# Patient Record
Sex: FEMALE | Race: WHITE | Employment: FULL TIME | URBAN - METROPOLITAN AREA
[De-identification: names, ages, dates, MRNs, and addresses within clinical notes are randomized per-mention and may not be internally consistent; named-entity substitution may affect disease eponyms.]

---

## 2017-02-01 RX ORDER — OMEPRAZOLE 20 MG/1
20 CAPSULE, DELAYED RELEASE ORAL 2 TIMES DAILY
Qty: 60 CAPSULE | Refills: 5 | Status: SHIPPED | OUTPATIENT
Start: 2017-02-01 | End: 2017-08-25 | Stop reason: ALTCHOICE

## 2017-02-01 RX ORDER — CITALOPRAM 10 MG/1
10 TABLET ORAL DAILY
Qty: 30 TABLET | Refills: 5 | Status: SHIPPED | OUTPATIENT
Start: 2017-02-01 | End: 2017-08-03 | Stop reason: SDUPTHER

## 2017-08-03 ENCOUNTER — PATIENT MESSAGE (OUTPATIENT)
Dept: FAMILY MEDICINE CLINIC | Age: 47
End: 2017-08-03

## 2017-08-03 RX ORDER — CITALOPRAM 10 MG/1
10 TABLET ORAL DAILY
Qty: 30 TABLET | Refills: 0 | Status: SHIPPED | OUTPATIENT
Start: 2017-08-03 | End: 2017-08-23 | Stop reason: SDUPTHER

## 2017-08-23 ENCOUNTER — OFFICE VISIT (OUTPATIENT)
Dept: FAMILY MEDICINE CLINIC | Age: 47
End: 2017-08-23
Payer: OTHER GOVERNMENT

## 2017-08-23 VITALS
WEIGHT: 179 LBS | HEART RATE: 76 BPM | TEMPERATURE: 98.1 F | BODY MASS INDEX: 28.77 KG/M2 | HEIGHT: 66 IN | SYSTOLIC BLOOD PRESSURE: 118 MMHG | DIASTOLIC BLOOD PRESSURE: 72 MMHG | RESPIRATION RATE: 18 BRPM

## 2017-08-23 DIAGNOSIS — K21.9 GASTROESOPHAGEAL REFLUX DISEASE, ESOPHAGITIS PRESENCE NOT SPECIFIED: ICD-10-CM

## 2017-08-23 DIAGNOSIS — Z00.00 ANNUAL PHYSICAL EXAM: ICD-10-CM

## 2017-08-23 DIAGNOSIS — F41.1 GENERALIZED ANXIETY DISORDER: Primary | ICD-10-CM

## 2017-08-23 DIAGNOSIS — Z11.4 SCREENING FOR HIV (HUMAN IMMUNODEFICIENCY VIRUS): ICD-10-CM

## 2017-08-23 PROCEDURE — 99214 OFFICE O/P EST MOD 30 MIN: CPT | Performed by: PEDIATRICS

## 2017-08-23 RX ORDER — CITALOPRAM 10 MG/1
10 TABLET ORAL DAILY
Qty: 90 TABLET | Refills: 0 | Status: SHIPPED | OUTPATIENT
Start: 2017-08-23 | End: 2017-11-10 | Stop reason: SDUPTHER

## 2017-08-23 RX ORDER — FAMOTIDINE 20 MG/1
20 TABLET, FILM COATED ORAL 2 TIMES DAILY
Qty: 180 TABLET | Refills: 1 | Status: SHIPPED | OUTPATIENT
Start: 2017-08-23 | End: 2017-11-13 | Stop reason: ALTCHOICE

## 2017-08-23 ASSESSMENT — PATIENT HEALTH QUESTIONNAIRE - PHQ9
SUM OF ALL RESPONSES TO PHQ QUESTIONS 1-9: 0
2. FEELING DOWN, DEPRESSED OR HOPELESS: 0
SUM OF ALL RESPONSES TO PHQ9 QUESTIONS 1 & 2: 0
1. LITTLE INTEREST OR PLEASURE IN DOING THINGS: 0

## 2017-08-25 ASSESSMENT — ENCOUNTER SYMPTOMS
TROUBLE SWALLOWING: 0
COLOR CHANGE: 0
RHINORRHEA: 0
EYE REDNESS: 0
CHEST TIGHTNESS: 0
SHORTNESS OF BREATH: 0
COUGH: 0
WHEEZING: 0
VOMITING: 0
DIARRHEA: 0
CONSTIPATION: 0
EYE DISCHARGE: 0
EYE PAIN: 0
SORE THROAT: 0
BACK PAIN: 0
SINUS PRESSURE: 0
ABDOMINAL PAIN: 0
NAUSEA: 0
BLOOD IN STOOL: 0

## 2017-09-13 RX ORDER — OMEPRAZOLE 20 MG/1
20 CAPSULE, DELAYED RELEASE ORAL 2 TIMES DAILY
Qty: 60 CAPSULE | Refills: 5 | Status: SHIPPED | OUTPATIENT
Start: 2017-09-13 | End: 2017-11-13 | Stop reason: SDUPTHER

## 2017-11-08 ENCOUNTER — NURSE ONLY (OUTPATIENT)
Dept: FAMILY MEDICINE CLINIC | Age: 47
End: 2017-11-08
Payer: OTHER GOVERNMENT

## 2017-11-08 ENCOUNTER — HOSPITAL ENCOUNTER (OUTPATIENT)
Age: 47
Setting detail: SPECIMEN
Discharge: HOME | End: 2017-11-08
Payer: OTHER GOVERNMENT

## 2017-11-08 DIAGNOSIS — F41.1 GENERALIZED ANXIETY DISORDER: ICD-10-CM

## 2017-11-08 DIAGNOSIS — Z00.00 ANNUAL PHYSICAL EXAM: ICD-10-CM

## 2017-11-08 DIAGNOSIS — Z11.4 SCREENING FOR HIV (HUMAN IMMUNODEFICIENCY VIRUS): ICD-10-CM

## 2017-11-08 LAB
ALBUMIN SERPL-MCNC: 4.2 G/DL (ref 3.5–5.2)
ALBUMIN/GLOBULIN RATIO: 1.4 (ref 1–2.5)
ALP BLD-CCNC: 66 U/L (ref 35–104)
ALT SERPL-CCNC: 12 U/L (ref 5–33)
ANION GAP SERPL CALCULATED.3IONS-SCNC: 16 MMOL/L (ref 9–17)
AST SERPL-CCNC: 17 U/L
BILIRUB SERPL-MCNC: 0.44 MG/DL (ref 0.3–1.2)
BILIRUBIN URINE: NEGATIVE
BUN BLDV-MCNC: 9 MG/DL (ref 6–20)
BUN/CREAT BLD: ABNORMAL (ref 9–20)
CALCIUM SERPL-MCNC: 9 MG/DL (ref 8.6–10.4)
CHLORIDE BLD-SCNC: 101 MMOL/L (ref 98–107)
CHOLESTEROL/HDL RATIO: 3
CHOLESTEROL: 178 MG/DL
CO2: 23 MMOL/L (ref 20–31)
COLOR: YELLOW
COMMENT UA: NORMAL
CREAT SERPL-MCNC: 0.56 MG/DL (ref 0.5–0.9)
GFR AFRICAN AMERICAN: >60 ML/MIN
GFR NON-AFRICAN AMERICAN: >60 ML/MIN
GFR SERPL CREATININE-BSD FRML MDRD: ABNORMAL ML/MIN/{1.73_M2}
GFR SERPL CREATININE-BSD FRML MDRD: ABNORMAL ML/MIN/{1.73_M2}
GLUCOSE BLD-MCNC: 69 MG/DL (ref 70–99)
GLUCOSE URINE: NEGATIVE
HCT VFR BLD CALC: 40.7 % (ref 36.3–47.1)
HDLC SERPL-MCNC: 59 MG/DL
HEMOGLOBIN: 12.6 G/DL (ref 11.9–15.1)
HIV AG/AB: NONREACTIVE
KETONES, URINE: NEGATIVE
LDL CHOLESTEROL: 110 MG/DL (ref 0–130)
LEUKOCYTE ESTERASE, URINE: NEGATIVE
MCH RBC QN AUTO: 30.2 PG (ref 25.2–33.5)
MCHC RBC AUTO-ENTMCNC: 31 G/DL (ref 28.4–34.8)
MCV RBC AUTO: 97.6 FL (ref 82.6–102.9)
NITRITE, URINE: NEGATIVE
PDW BLD-RTO: 12.8 % (ref 11.8–14.4)
PH UA: 7.5 (ref 5–8)
PLATELET # BLD: 217 K/UL (ref 138–453)
PMV BLD AUTO: 12 FL (ref 8.1–13.5)
POTASSIUM SERPL-SCNC: 4.3 MMOL/L (ref 3.7–5.3)
PROTEIN UA: NEGATIVE
RBC # BLD: 4.17 M/UL (ref 3.95–5.11)
SODIUM BLD-SCNC: 140 MMOL/L (ref 135–144)
SPECIFIC GRAVITY UA: 1.01 (ref 1–1.03)
TOTAL PROTEIN: 7.3 G/DL (ref 6.4–8.3)
TRIGL SERPL-MCNC: 44 MG/DL
TURBIDITY: CLEAR
URINE HGB: NEGATIVE
UROBILINOGEN, URINE: NORMAL
VITAMIN D 25-HYDROXY: 21.9 NG/ML (ref 30–100)
VLDLC SERPL CALC-MCNC: NORMAL MG/DL (ref 1–30)
WBC # BLD: 5.2 K/UL (ref 3.5–11.3)

## 2017-11-08 PROCEDURE — 90688 IIV4 VACCINE SPLT 0.5 ML IM: CPT | Performed by: PEDIATRICS

## 2017-11-08 PROCEDURE — 90471 IMMUNIZATION ADMIN: CPT | Performed by: PEDIATRICS

## 2017-11-10 DIAGNOSIS — F41.1 GENERALIZED ANXIETY DISORDER: ICD-10-CM

## 2017-11-10 RX ORDER — CITALOPRAM 10 MG/1
10 TABLET ORAL DAILY
Qty: 90 TABLET | Refills: 1 | Status: SHIPPED | OUTPATIENT
Start: 2017-11-10 | End: 2018-06-20 | Stop reason: SDUPTHER

## 2017-11-13 ENCOUNTER — HOSPITAL ENCOUNTER (OUTPATIENT)
Age: 47
Setting detail: SPECIMEN
Discharge: HOME | End: 2017-11-13
Payer: OTHER GOVERNMENT

## 2017-11-13 ENCOUNTER — OFFICE VISIT (OUTPATIENT)
Dept: FAMILY MEDICINE CLINIC | Age: 47
End: 2017-11-13
Payer: OTHER GOVERNMENT

## 2017-11-13 VITALS
DIASTOLIC BLOOD PRESSURE: 70 MMHG | SYSTOLIC BLOOD PRESSURE: 116 MMHG | BODY MASS INDEX: 28.57 KG/M2 | RESPIRATION RATE: 18 BRPM | WEIGHT: 177 LBS | TEMPERATURE: 99.4 F | HEART RATE: 78 BPM

## 2017-11-13 DIAGNOSIS — J06.9 ACUTE URI: ICD-10-CM

## 2017-11-13 DIAGNOSIS — Z12.39 SCREENING FOR BREAST CANCER: ICD-10-CM

## 2017-11-13 DIAGNOSIS — F41.1 GENERALIZED ANXIETY DISORDER: ICD-10-CM

## 2017-11-13 DIAGNOSIS — Z01.419 ENCOUNTER FOR ROUTINE GYNECOLOGICAL EXAMINATION WITH PAPANICOLAOU SMEAR OF CERVIX: Primary | ICD-10-CM

## 2017-11-13 DIAGNOSIS — K21.9 GASTROESOPHAGEAL REFLUX DISEASE, ESOPHAGITIS PRESENCE NOT SPECIFIED: ICD-10-CM

## 2017-11-13 PROCEDURE — 99396 PREV VISIT EST AGE 40-64: CPT | Performed by: PEDIATRICS

## 2017-11-13 RX ORDER — OMEPRAZOLE 20 MG/1
20 CAPSULE, DELAYED RELEASE ORAL 2 TIMES DAILY
Qty: 180 CAPSULE | Refills: 5 | Status: SHIPPED | OUTPATIENT
Start: 2017-11-13 | End: 2018-01-31 | Stop reason: ALTCHOICE

## 2017-11-13 ASSESSMENT — ENCOUNTER SYMPTOMS
SHORTNESS OF BREATH: 0
STRIDOR: 0
EYE DISCHARGE: 0
NAUSEA: 0
VOMITING: 0
DIARRHEA: 0
BACK PAIN: 0
WHEEZING: 0
EYE PAIN: 0
ABDOMINAL PAIN: 0
PHOTOPHOBIA: 0
EYE REDNESS: 0
RHINORRHEA: 1
SORE THROAT: 1
CONSTIPATION: 0
COUGH: 1

## 2017-11-13 NOTE — PROGRESS NOTES
Subjective:      Patient ID: Lisa Quarles is a 41 y.o. female. Visit Information    Have you changed or started any medications since your last visit including any over-the-counter medicines, vitamins, or herbal medicines? no   Are you having any side effects from any of your medications? -  no  Have you stopped taking any of your medications? Is so, why? -  no    Have you seen any other physician or provider since your last visit? No  Have you had any other diagnostic tests since your last visit? No  Have you been seen in the emergency room and/or had an admission to a hospital since we last saw you? No  Have you had your routine dental cleaning in the past 6 months? yes -     Have you activated your [employer] account? If not, what are your barriers? Yes     Patient Care Team:  Emily Mackey MD as PCP - General    Medical History Review  Past Medical, Family, and Social History reviewed and does contribute to the patient presenting condition    Health Maintenance   Topic Date Due    Cervical cancer screen  03/12/2018    Lipid screen  11/08/2022    DTaP/Tdap/Td vaccine (2 - Td) 01/29/2025    Flu vaccine  Completed    HIV screen  Completed     HPI:     Patient presents today for routine gynecologic exam.  She has normal menstrual cycles regularly but has noticed that there are some variations in her cycles occasionally. She denies any pelvic pain, abnormal vaginal bleeding or vaginal discharge. She is sexually active and has had a tubal ligation. She has no worries with her menstrual cycles. She is not performing a monthly self breast exam but she was encouraged to do so. She does have a history of generalized anxiety disorder and has been taking Celexa 10 mg once daily with good control of her symptoms. She also has a history of GERD for which she takes omeprazole daily. She states this controls her symptoms fairly well when she eats something that causes a flare up of her acid reflux. She does occasionally take an over-the-counter Zantac usually twice weekly for persistence of symptoms. She tells me she did start having some upper respiratory symptoms including sore throat, nasal congestion, rhinorrhea, post nasal drip and mild cough over the last 2 days. She has no other concerns at this time. cmw      Gynecologic Exam   The patient's pertinent negatives include no genital itching, genital lesions, genital odor, genital rash, missed menses, pelvic pain, vaginal bleeding or vaginal discharge. The patient is experiencing no pain. The problem affects both sides. She is not pregnant. Associated symptoms include a sore throat. Pertinent negatives include no abdominal pain, anorexia, back pain, chills, constipation, diarrhea, discolored urine, dysuria, fever, flank pain, frequency, headaches, hematuria, joint pain, joint swelling, nausea, painful intercourse, rash, urgency or vomiting. Nothing aggravates the symptoms. She has tried nothing for the symptoms. She is sexually active. No, her partner does not have an STD. Her menstrual history has been irregular. Review of Systems   Constitutional: Negative for chills and fever. HENT: Positive for congestion, postnasal drip, rhinorrhea and sore throat. Eyes: Negative for photophobia, pain, discharge and redness. Respiratory: Positive for cough. Negative for shortness of breath, wheezing and stridor. Cardiovascular: Negative for chest pain, palpitations and leg swelling. Gastrointestinal: Negative for abdominal pain, anorexia, constipation, diarrhea, nausea and vomiting. GERD controlled well with omeprazole and occasional OTC zantac. Endocrine: Negative for polydipsia, polyphagia and polyuria. Genitourinary: Negative for dysuria, flank pain, frequency, hematuria, missed menses, pelvic pain, urgency and vaginal discharge. Musculoskeletal: Negative for back pain, joint pain and myalgias. Skin: Negative for rash. Allergic/Immunologic: Negative for immunocompromised state. Neurological: Negative for dizziness, light-headedness and headaches. Hematological: Negative for adenopathy. Psychiatric/Behavioral: Negative for dysphoric mood. The patient is nervous/anxious (Generalized anxiety disorder controlled well with Celexa). Objective:   Physical Exam   Constitutional: She is oriented to person, place, and time. She appears well-developed and well-nourished. No distress. obese   HENT:   Head: Normocephalic. Right Ear: External ear normal.   Left Ear: External ear normal.   Nose: Nose normal.   Mouth/Throat: Oropharynx is clear and moist. No oropharyngeal exudate. Eyes: Conjunctivae and EOM are normal. Pupils are equal, round, and reactive to light. Right eye exhibits no discharge. Left eye exhibits no discharge. No scleral icterus. Neck: Normal range of motion. Neck supple. No JVD present. Cardiovascular: Normal rate, normal heart sounds and intact distal pulses. No murmur heard. Pulmonary/Chest: Effort normal and breath sounds normal. She has no decreased breath sounds. She has no wheezes. She has no rhonchi. She has no rales. Right breast exhibits no inverted nipple, no mass, no nipple discharge, no skin change and no tenderness. Left breast exhibits no inverted nipple, no mass, no nipple discharge, no skin change and no tenderness. Abdominal: Soft. Bowel sounds are normal. There is no tenderness. Hernia confirmed negative in the right inguinal area. Genitourinary: Vagina normal and uterus normal. No breast swelling, tenderness, discharge or bleeding. There is no rash, tenderness, lesion or injury on the right labia. There is no tenderness, lesion or injury on the left labia. Cervix exhibits no motion tenderness, no discharge and no friability. Right adnexum displays no mass, no tenderness and no fullness. Left adnexum displays no mass, no tenderness and no fullness.    Musculoskeletal: She capsule by mouth 2 times daily       All patient questions answered. Patient voiced understanding. Quality Measures    Body mass index is 28.57 kg/m². Elevated. Weight control planned discussed Healthy diet and regular exercise. BP: 116/70 Blood pressure is normal. Treatment plan consists of No treatment change needed.     Lab Results   Component Value Date    LDLCHOLESTEROL 110 11/08/2017    (goal LDL reduction with dx if diabetes is 50% LDL reduction)      PHQ Scores 8/23/2017   PHQ2 Score 0   PHQ9 Score 0     Interpretation of Total Score Depression Severity: 1-4 = Minimal depression, 5-9 = Mild depression, 10-14 = Moderate depression, 15-19 = Moderately severe depression, 20-27 = Severe depression

## 2017-11-20 LAB — CYTOLOGY REPORT: NORMAL

## 2018-01-31 ENCOUNTER — OFFICE VISIT (OUTPATIENT)
Dept: FAMILY MEDICINE CLINIC | Age: 48
End: 2018-01-31
Payer: OTHER GOVERNMENT

## 2018-01-31 VITALS
BODY MASS INDEX: 28.73 KG/M2 | OXYGEN SATURATION: 98 % | DIASTOLIC BLOOD PRESSURE: 76 MMHG | TEMPERATURE: 97.8 F | HEART RATE: 80 BPM | HEIGHT: 66 IN | SYSTOLIC BLOOD PRESSURE: 118 MMHG | WEIGHT: 178.8 LBS

## 2018-01-31 DIAGNOSIS — J01.90 ACUTE BACTERIAL SINUSITIS: Primary | ICD-10-CM

## 2018-01-31 DIAGNOSIS — B96.89 ACUTE BACTERIAL SINUSITIS: Primary | ICD-10-CM

## 2018-01-31 PROCEDURE — 99213 OFFICE O/P EST LOW 20 MIN: CPT | Performed by: NURSE PRACTITIONER

## 2018-01-31 RX ORDER — DOXYCYCLINE HYCLATE 100 MG
100 TABLET ORAL 2 TIMES DAILY
Qty: 20 TABLET | Refills: 0 | Status: SHIPPED | OUTPATIENT
Start: 2018-01-31 | End: 2018-02-10

## 2018-01-31 ASSESSMENT — ENCOUNTER SYMPTOMS
CHEST TIGHTNESS: 0
HOARSE VOICE: 0
SINUS PRESSURE: 1
SORE THROAT: 0
SHORTNESS OF BREATH: 0
FACIAL SWELLING: 0
NAUSEA: 0
VOMITING: 0
COUGH: 1
SWOLLEN GLANDS: 0
ABDOMINAL PAIN: 0
EYE DISCHARGE: 0
ALLERGIC/IMMUNOLOGIC NEGATIVE: 1
SINUS PAIN: 1
EYE ITCHING: 0
EYES NEGATIVE: 1
DIARRHEA: 0

## 2018-01-31 NOTE — PROGRESS NOTES
discharge, facial swelling, hearing loss, hoarse voice, sneezing and sore throat. Eyes: Negative. Negative for discharge and itching. Respiratory: Positive for cough. Negative for chest tightness and shortness of breath. Cardiovascular: Negative for chest pain, palpitations and leg swelling. Gastrointestinal: Negative for abdominal pain, diarrhea, nausea and vomiting. Endocrine: Negative. Genitourinary: Negative for dysuria, frequency and urgency. Musculoskeletal: Negative for neck pain and neck stiffness. Skin: Negative for rash. Allergic/Immunologic: Negative. Neurological: Positive for headaches. Negative for dizziness, weakness and light-headedness. Denies photophobia    Hematological: Negative for adenopathy. Psychiatric/Behavioral: Negative for confusion. Objective:     Physical Exam   Constitutional: She is oriented to person, place, and time. Vital signs are normal. She appears well-developed and well-nourished. HENT:   Head: Normocephalic. Right Ear: External ear normal. A middle ear effusion is present. Left Ear: External ear normal. A middle ear effusion is present. Nose: Rhinorrhea and sinus tenderness present. Right sinus exhibits maxillary sinus tenderness and frontal sinus tenderness. Left sinus exhibits maxillary sinus tenderness and frontal sinus tenderness. Mouth/Throat: Posterior oropharyngeal edema and posterior oropharyngeal erythema present. Slight posterior pharyngeal edema and erythema    Eyes: Conjunctivae and EOM are normal. Pupils are equal, round, and reactive to light. Neck: Normal range of motion. Neck supple. No tracheal tenderness present. Cardiovascular: Normal rate, regular rhythm and normal heart sounds. Exam reveals no gallop and no friction rub. No murmur heard. Pulmonary/Chest: Effort normal and breath sounds normal. No accessory muscle usage. No respiratory distress. She has no decreased breath sounds.  She has no

## 2018-01-31 NOTE — PATIENT INSTRUCTIONS
hot, wet towel or a warm gel pack on your face 3 or 4 times a day for 5 to 10 minutes each time. · Try a decongestant nasal spray like oxymetazoline (Afrin). Do not use it for more than 3 days in a row. Using it for more than 3 days can make your congestion worse. When should you call for help? Call your doctor now or seek immediate medical care if:  ? · You have new or worse swelling or redness in your face or around your eyes. ? · You have a new or higher fever. ? Watch closely for changes in your health, and be sure to contact your doctor if:  ? · You have new or worse facial pain. ? · The mucus from your nose becomes thicker (like pus) or has new blood in it. ? · You are not getting better as expected. Where can you learn more? Go to [email or web address] and sign in to your [employer] account. Enter B297 in the [employer] box to learn more about \"Sinusitis: Care Instructions. \"     If you do not have an account, please click on the \"Sign Up Now\" link. Current as of: May 12, 2017  Content Version: 11.5  © 9384-9582 Healthwise, Incorporated. Care instructions adapted under license by [facility] ([facility]). If you have questions about a medical condition or this instruction, always ask your healthcare professional. [address]  any warranty or liability for your use of this information.

## 2018-02-16 ENCOUNTER — OFFICE VISIT (OUTPATIENT)
Dept: FAMILY MEDICINE CLINIC | Age: 48
End: 2018-02-16
Payer: OTHER GOVERNMENT

## 2018-02-16 VITALS
RESPIRATION RATE: 16 BRPM | BODY MASS INDEX: 28.25 KG/M2 | SYSTOLIC BLOOD PRESSURE: 112 MMHG | WEIGHT: 175 LBS | DIASTOLIC BLOOD PRESSURE: 72 MMHG | TEMPERATURE: 98.2 F | HEART RATE: 76 BPM

## 2018-02-16 DIAGNOSIS — F41.1 GENERALIZED ANXIETY DISORDER: ICD-10-CM

## 2018-02-16 DIAGNOSIS — R00.2 INTERMITTENT PALPITATIONS: Primary | ICD-10-CM

## 2018-02-16 DIAGNOSIS — I49.1 SUPRAVENTRICULAR EXTRASYSTOLES: ICD-10-CM

## 2018-02-16 PROCEDURE — 99214 OFFICE O/P EST MOD 30 MIN: CPT | Performed by: NURSE PRACTITIONER

## 2018-02-16 RX ORDER — ONDANSETRON 4 MG/1
TABLET, ORALLY DISINTEGRATING ORAL
COMMUNITY
Start: 2018-02-15 | End: 2020-01-02 | Stop reason: ALTCHOICE

## 2018-02-16 RX ORDER — FAMOTIDINE 20 MG/1
TABLET, FILM COATED ORAL
COMMUNITY
Start: 2018-02-15 | End: 2018-08-10 | Stop reason: ALTCHOICE

## 2018-02-16 NOTE — PROGRESS NOTES
Subjective:      Patient ID: Jerrica Hyatt is a 41 y.o. female. Have you seen any other physician or provider since your last visit Yes [facility]. Walk in    Have you had any other diagnostic tests since your last visit? Yes, Xray, Labs, EKG,     Have you changed or stopped any medications since your last visit including any over-the-counter medicines, vitamins, or herbal medicines? no     Are you taking all your prescribed medications? Yes  If NO, why? -  N/A           Patient Self-Management Goal for this visit. What is your goal for your visit today? - Follow up from [address] [place] [address]   Barriers to success: none   Plan for overcoming my barriers: N/A      Confidence: 10/10   Date goal set: 2/16/18   Date expected to reach goal: 1day    Medical history Review  Past Medical, Family, and Social History reviewed and does contribute to the patient presenting condition    There are no preventive care reminders to display for this patient. /72   Pulse 76   Temp 98.2 °F (36.8 °C) (Tympanic)   Resp 16   Wt 175 lb (79.4 kg)   LMP 02/02/2018   BMI 28.25 kg/m²     Current Outpatient Prescriptions   Medication Sig Dispense Refill    ondansetron (ZOFRAN-ODT) 4 MG disintegrating tablet       famotidine (PEPCID) 20 MG tablet       citalopram (CELEXA) 10 MG tablet Take 1 tablet by mouth daily 90 tablet 1     No current facility-administered medications for this visit. Patient presents in the office today for a follow up from the ER. Patient states that she went to [address] [place] [address] for Chest pain on Wed night. Electronically signed by Christina Austin MA on 2/16/2018 at 11:22 AM      Patient presents to the office today for follow-up of recent ED visit. Patient reports that she did develop some palpitations and chest discomfort, proceeded to the emergency department. Did have a full cardiac workup.   Cardiac enzymes and additional blood work were all normal.  EKG did not show any acute changes, did show Mouth/Throat: Oropharynx is clear and moist. No oropharyngeal exudate. Eyes: Conjunctivae and EOM are normal. Pupils are equal, round, and reactive to light. Right eye exhibits no discharge. Left eye exhibits no discharge. No scleral icterus. Neck: Normal range of motion. Neck supple. No JVD present. Cardiovascular: Normal rate, regular rhythm, normal heart sounds and intact distal pulses. No murmur heard. Pulses:       Carotid pulses are 2+ on the right side, and 2+ on the left side. Radial pulses are 2+ on the right side, and 2+ on the left side. Posterior tibial pulses are 2+ on the right side, and 2+ on the left side. Pulmonary/Chest: Effort normal and breath sounds normal. She has no decreased breath sounds. She has no wheezes. She has no rhonchi. She has no rales. Right breast exhibits no inverted nipple, no mass, no nipple discharge, no skin change and no tenderness. Left breast exhibits no inverted nipple, no mass, no nipple discharge, no skin change and no tenderness. Abdominal: Soft. Bowel sounds are normal. There is no tenderness. Genitourinary: Cervix exhibits no friability. Musculoskeletal: She exhibits no edema. Lymphadenopathy:     She has no cervical adenopathy. Neurological: She is alert and oriented to person, place, and time. Skin: Skin is warm. She is not diaphoretic. Psychiatric: She has a normal mood and affect. Her behavior is normal. Judgment and thought content normal.   Nursing note and vitals reviewed. Assessment:      1. Intermittent palpitations  Holter Monitor 48 Hour   2. Supraventricular extrasystoles     3. Generalized anxiety disorder           Plan:      ED records reviewed. Labs within normal limits. EKG shows supraventricular ectopies. Proceed with Holter monitor as ordered. Continue to monitor symptoms. Avoid excess caffeine. Obtain adequate nightly rest.  Maintain hydration status. Call office with any concerns.       Return in about 3 months (around 5/16/2018), or if symptoms worsen or fail to improve, for Routine follow up. Rebeca received counseling on the following healthy behaviors: nutrition, exercise and medication adherence  Reviewed prior labs and health maintenance  Continue current medications, diet and exercise. Discussed use, benefit, and side effects of prescribed medications. Barriers to medication compliance addressed. Patient given educational materials - see patient instructions  Was a self-tracking handout given in paper form or via [employer]? No:     Requested Prescriptions      No prescriptions requested or ordered in this encounter       All patient questions answered. Patient voiced understanding. Quality Measures    Body mass index is 28.25 kg/m². Normal. Weight control planned discussed Healthy diet and regular exercise. BP: 112/72 Blood pressure is normal. Treatment plan consists of No treatment change needed.     Lab Results   Component Value Date    LDLCHOLESTEROL 110 11/08/2017    (goal LDL reduction with dx if diabetes is 50% LDL reduction)      PHQ Scores 8/23/2017   PHQ2 Score 0   PHQ9 Score 0     Interpretation of Total Score Depression Severity: 1-4 = Minimal depression, 5-9 = Mild depression, 10-14 = Moderate depression, 15-19 = Moderately severe depression, 20-27 = Severe depression

## 2018-02-25 ASSESSMENT — ENCOUNTER SYMPTOMS
COUGH: 0
NAUSEA: 0
SHORTNESS OF BREATH: 0
SORE THROAT: 0
BACK PAIN: 0
ABDOMINAL PAIN: 0
VOMITING: 0
BLOOD IN STOOL: 0
WHEEZING: 0
CHEST TIGHTNESS: 0
RHINORRHEA: 0
DIARRHEA: 0
PHOTOPHOBIA: 0
EYE PAIN: 0
ABDOMINAL DISTENTION: 0
CONSTIPATION: 0

## 2018-03-19 ENCOUNTER — TELEPHONE (OUTPATIENT)
Dept: FAMILY MEDICINE CLINIC | Age: 48
End: 2018-03-19

## 2018-03-19 DIAGNOSIS — R00.2 PALPITATIONS: Primary | ICD-10-CM

## 2018-03-19 NOTE — TELEPHONE ENCOUNTER
Is she able to fax a list of cardiologist covered and I will review this and let her know from the list.

## 2018-03-20 ENCOUNTER — TELEPHONE (OUTPATIENT)
Dept: FAMILY MEDICINE CLINIC | Age: 48
End: 2018-03-20

## 2018-04-03 ENCOUNTER — HOSPITAL ENCOUNTER (OUTPATIENT)
Age: 48
Setting detail: SPECIMEN
Discharge: HOME | End: 2018-04-03
Payer: OTHER GOVERNMENT

## 2018-04-03 DIAGNOSIS — R00.2 PALPITATIONS: ICD-10-CM

## 2018-04-03 LAB
MAGNESIUM: 2.2 MG/DL (ref 1.6–2.6)
T4 TOTAL: 5.5 UG/DL (ref 4.5–12)
TSH SERPL DL<=0.05 MIU/L-ACNC: 1.85 MIU/L (ref 0.3–5)

## 2018-05-29 PROBLEM — R00.2 PALPITATIONS: Status: RESOLVED | Noted: 2018-04-03 | Resolved: 2018-05-29

## 2018-06-20 DIAGNOSIS — K21.9 GASTROESOPHAGEAL REFLUX DISEASE, ESOPHAGITIS PRESENCE NOT SPECIFIED: ICD-10-CM

## 2018-06-20 DIAGNOSIS — F41.1 GENERALIZED ANXIETY DISORDER: ICD-10-CM

## 2018-06-20 RX ORDER — CITALOPRAM 10 MG/1
10 TABLET ORAL DAILY
Qty: 90 TABLET | Refills: 1 | Status: SHIPPED | OUTPATIENT
Start: 2018-06-20 | End: 2019-02-05 | Stop reason: SDUPTHER

## 2018-06-20 RX ORDER — OMEPRAZOLE 20 MG/1
20 CAPSULE, DELAYED RELEASE ORAL 2 TIMES DAILY
Qty: 180 CAPSULE | Refills: 1 | OUTPATIENT
Start: 2018-06-20 | End: 2019-06-20

## 2018-08-10 ENCOUNTER — OFFICE VISIT (OUTPATIENT)
Dept: FAMILY MEDICINE CLINIC | Age: 48
End: 2018-08-10
Payer: OTHER GOVERNMENT

## 2018-08-10 VITALS
RESPIRATION RATE: 20 BRPM | BODY MASS INDEX: 28.89 KG/M2 | HEART RATE: 76 BPM | WEIGHT: 179 LBS | DIASTOLIC BLOOD PRESSURE: 72 MMHG | TEMPERATURE: 98.4 F | SYSTOLIC BLOOD PRESSURE: 114 MMHG

## 2018-08-10 DIAGNOSIS — R00.2 INTERMITTENT PALPITATIONS: ICD-10-CM

## 2018-08-10 DIAGNOSIS — F41.1 GENERALIZED ANXIETY DISORDER: Primary | ICD-10-CM

## 2018-08-10 DIAGNOSIS — K21.9 GASTROESOPHAGEAL REFLUX DISEASE, ESOPHAGITIS PRESENCE NOT SPECIFIED: ICD-10-CM

## 2018-08-10 PROCEDURE — 99214 OFFICE O/P EST MOD 30 MIN: CPT | Performed by: PEDIATRICS

## 2018-08-10 ASSESSMENT — ENCOUNTER SYMPTOMS
TROUBLE SWALLOWING: 0
EYE PAIN: 0
SHORTNESS OF BREATH: 0
SORE THROAT: 0
DIARRHEA: 0
COUGH: 0
CONSTIPATION: 0
EYE DISCHARGE: 0
RHINORRHEA: 0
BACK PAIN: 0
BLOOD IN STOOL: 0
NAUSEA: 0
EYE REDNESS: 0
SINUS PRESSURE: 0
CHEST TIGHTNESS: 0
COLOR CHANGE: 0
WHEEZING: 0
VOMITING: 0
ABDOMINAL PAIN: 0

## 2018-08-10 NOTE — PROGRESS NOTES
Musculoskeletal: Negative for arthralgias, back pain and myalgias. Skin: Negative for color change and rash. Allergic/Immunologic: Negative for immunocompromised state. Neurological: Negative for dizziness, syncope, weakness, light-headedness and headaches. Hematological: Negative for adenopathy. Psychiatric/Behavioral: Negative for behavioral problems, confusion and sleep disturbance. The patient is nervous/anxious (controlled with Celexa). Objective:     /72 (Site: Right Arm, Position: Sitting, Cuff Size: Medium Adult)   Pulse 76   Temp 98.4 °F (36.9 °C) (Tympanic)   Resp 20   Wt 179 lb (81.2 kg)   LMP 08/01/2018 (Approximate)   Breastfeeding? No   BMI 28.89 kg/m²        Physical Exam   Constitutional: She is oriented to person, place, and time. She appears well-developed and well-nourished. No distress. HENT:   Head: Normocephalic. Right Ear: External ear normal.   Left Ear: External ear normal.   Nose: Nose normal.   Mouth/Throat: Oropharynx is clear and moist. No oropharyngeal exudate. Eyes: Conjunctivae and EOM are normal. Pupils are equal, round, and reactive to light. Right eye exhibits no discharge. Left eye exhibits no discharge. No scleral icterus. Neck: Normal range of motion. Neck supple. No JVD present. Cardiovascular: Normal rate, normal heart sounds and intact distal pulses. No murmur heard. Pulmonary/Chest: Effort normal and breath sounds normal. She has no decreased breath sounds. She has no wheezes. She has no rhonchi. She has no rales. Right breast exhibits no inverted nipple, no mass, no nipple discharge, no skin change and no tenderness. Left breast exhibits no inverted nipple, no mass, no nipple discharge, no skin change and no tenderness. Abdominal: Soft. Bowel sounds are normal. There is no tenderness. Genitourinary: Cervix exhibits no friability. Musculoskeletal: She exhibits no edema. Lymphadenopathy:     She has no cervical adenopathy.

## 2018-10-24 ENCOUNTER — NURSE ONLY (OUTPATIENT)
Dept: FAMILY MEDICINE CLINIC | Age: 48
End: 2018-10-24
Payer: OTHER GOVERNMENT

## 2018-10-24 DIAGNOSIS — Z23 NEED FOR INFLUENZA VACCINATION: Primary | ICD-10-CM

## 2018-10-24 PROCEDURE — 90471 IMMUNIZATION ADMIN: CPT | Performed by: PEDIATRICS

## 2018-10-24 PROCEDURE — 90688 IIV4 VACCINE SPLT 0.5 ML IM: CPT | Performed by: PEDIATRICS

## 2018-12-20 ENCOUNTER — OFFICE VISIT (OUTPATIENT)
Dept: FAMILY MEDICINE CLINIC | Age: 48
End: 2018-12-20
Payer: OTHER GOVERNMENT

## 2018-12-20 VITALS
TEMPERATURE: 98.5 F | SYSTOLIC BLOOD PRESSURE: 102 MMHG | DIASTOLIC BLOOD PRESSURE: 74 MMHG | WEIGHT: 175.8 LBS | HEART RATE: 60 BPM | RESPIRATION RATE: 18 BRPM | BODY MASS INDEX: 28.37 KG/M2

## 2018-12-20 DIAGNOSIS — R00.2 HEART PALPITATIONS: Primary | ICD-10-CM

## 2018-12-20 PROCEDURE — 93000 ELECTROCARDIOGRAM COMPLETE: CPT | Performed by: NURSE PRACTITIONER

## 2018-12-20 PROCEDURE — 99213 OFFICE O/P EST LOW 20 MIN: CPT | Performed by: NURSE PRACTITIONER

## 2018-12-20 ASSESSMENT — ENCOUNTER SYMPTOMS
ABDOMINAL PAIN: 0
DIARRHEA: 0
EYE REDNESS: 0
COUGH: 0
NAUSEA: 0
SORE THROAT: 0
SHORTNESS OF BREATH: 0
EYE DISCHARGE: 0
RHINORRHEA: 0
EYE ITCHING: 0
CONSTIPATION: 0

## 2018-12-20 ASSESSMENT — PATIENT HEALTH QUESTIONNAIRE - PHQ9
SUM OF ALL RESPONSES TO PHQ9 QUESTIONS 1 & 2: 0
2. FEELING DOWN, DEPRESSED OR HOPELESS: 0
SUM OF ALL RESPONSES TO PHQ QUESTIONS 1-9: 0
1. LITTLE INTEREST OR PLEASURE IN DOING THINGS: 0
SUM OF ALL RESPONSES TO PHQ QUESTIONS 1-9: 0

## 2018-12-26 RX ORDER — OMEPRAZOLE 20 MG/1
20 CAPSULE, DELAYED RELEASE ORAL 2 TIMES DAILY
Qty: 60 CAPSULE | Refills: 5 | Status: SHIPPED | OUTPATIENT
Start: 2018-12-26 | End: 2020-07-16 | Stop reason: ALTCHOICE

## 2019-02-05 DIAGNOSIS — F41.1 GENERALIZED ANXIETY DISORDER: ICD-10-CM

## 2019-02-06 RX ORDER — CITALOPRAM 10 MG/1
10 TABLET ORAL DAILY
Qty: 90 TABLET | Refills: 1 | Status: SHIPPED | OUTPATIENT
Start: 2019-02-06 | End: 2020-01-02

## 2019-09-28 ENCOUNTER — NURSE ONLY (OUTPATIENT)
Dept: FAMILY MEDICINE CLINIC | Age: 49
End: 2019-09-28
Payer: OTHER GOVERNMENT

## 2019-09-28 DIAGNOSIS — Z23 FLU VACCINE NEED: Primary | ICD-10-CM

## 2019-09-28 PROCEDURE — 90471 IMMUNIZATION ADMIN: CPT | Performed by: NURSE PRACTITIONER

## 2019-09-28 PROCEDURE — 90686 IIV4 VACC NO PRSV 0.5 ML IM: CPT | Performed by: NURSE PRACTITIONER

## 2019-09-28 NOTE — PROGRESS NOTES
Vaccine Information Sheet, \"Influenza - Inactivated\"  given to Brendon Mead  ,or parent/legal guardian of  Brendon Mead and verbalized understanding. Patient responses:    Have you ever had a reaction to a flu vaccine? No  Are you able to eat eggs without adverse effects? Yes  Do you have any current illness? No  Have you ever had Guillian Moclips Syndrome? No    Flu vaccine given per order. Please see immunization tab.

## 2019-12-31 ENCOUNTER — TELEPHONE (OUTPATIENT)
Dept: FAMILY MEDICINE CLINIC | Age: 49
End: 2019-12-31

## 2019-12-31 RX ORDER — HYDROXYZINE HYDROCHLORIDE 25 MG/1
25 TABLET, FILM COATED ORAL EVERY 8 HOURS PRN
Qty: 30 TABLET | Refills: 0 | Status: SHIPPED | OUTPATIENT
Start: 2019-12-31 | End: 2020-02-11

## 2019-12-31 NOTE — TELEPHONE ENCOUNTER
The only thing I can call in without seeing her is something called hydroxyzine. It is an antihistamine used for anxiety. It may make her drowsy so if she is taking it I recommend she not drive. I will send a small supply to [facility] in [address] [place] [address] (there is no preferred pharmacy listed but this is where other medications have been sent previously). Also she was scheduled in a slot on 1/2/20 that already has a patient scheduled. She will have to be moved.

## 2020-01-02 ENCOUNTER — OFFICE VISIT (OUTPATIENT)
Dept: FAMILY MEDICINE CLINIC | Age: 50
End: 2020-01-02
Payer: OTHER GOVERNMENT

## 2020-01-02 VITALS
SYSTOLIC BLOOD PRESSURE: 116 MMHG | WEIGHT: 174 LBS | DIASTOLIC BLOOD PRESSURE: 80 MMHG | TEMPERATURE: 98.7 F | RESPIRATION RATE: 20 BRPM | BODY MASS INDEX: 28.08 KG/M2 | HEART RATE: 76 BPM

## 2020-01-02 PROCEDURE — 99214 OFFICE O/P EST MOD 30 MIN: CPT | Performed by: PEDIATRICS

## 2020-01-02 ASSESSMENT — ENCOUNTER SYMPTOMS
BLOOD IN STOOL: 0
SORE THROAT: 0
COUGH: 0
SHORTNESS OF BREATH: 0
TROUBLE SWALLOWING: 0
ABDOMINAL PAIN: 0
VOMITING: 0
COLOR CHANGE: 0
WHEEZING: 0
CONSTIPATION: 0
BACK PAIN: 0
EYE DISCHARGE: 0
NAUSEA: 1
SINUS PRESSURE: 0
RHINORRHEA: 0
DIARRHEA: 0
EYE REDNESS: 0
EYE PAIN: 0
CHEST TIGHTNESS: 0

## 2020-01-02 ASSESSMENT — PATIENT HEALTH QUESTIONNAIRE - PHQ9
SUM OF ALL RESPONSES TO PHQ QUESTIONS 1-9: 2
1. LITTLE INTEREST OR PLEASURE IN DOING THINGS: 1
2. FEELING DOWN, DEPRESSED OR HOPELESS: 1
SUM OF ALL RESPONSES TO PHQ QUESTIONS 1-9: 2
SUM OF ALL RESPONSES TO PHQ9 QUESTIONS 1 & 2: 2

## 2020-01-02 NOTE — PROGRESS NOTES
Sitting, Cuff Size: Medium Adult)   Pulse 76   Temp 98.7 °F (37.1 °C) (Tympanic)   Resp 20   Wt 174 lb (78.9 kg)   LMP 12/23/2019 (Approximate)   Breastfeeding? No   BMI 28.08 kg/m²        Physical Exam  Vitals signs and nursing note reviewed. Constitutional:       General: She is not in acute distress. Appearance: Normal appearance. She is well-developed. She is not ill-appearing, toxic-appearing or diaphoretic. HENT:      Head: Normocephalic. Right Ear: Tympanic membrane, ear canal and external ear normal. There is no impacted cerumen. Left Ear: Tympanic membrane, ear canal and external ear normal. There is no impacted cerumen. Nose: Nose normal. No congestion or rhinorrhea. Mouth/Throat:      Mouth: Mucous membranes are moist.      Pharynx: No oropharyngeal exudate or posterior oropharyngeal erythema. Eyes:      General: No scleral icterus. Right eye: No discharge. Left eye: No discharge. Extraocular Movements: Extraocular movements intact. Conjunctiva/sclera: Conjunctivae normal.      Pupils: Pupils are equal, round, and reactive to light. Neck:      Musculoskeletal: Normal range of motion and neck supple. Vascular: No JVD. Cardiovascular:      Rate and Rhythm: Normal rate. Heart sounds: Normal heart sounds. No murmur. Pulmonary:      Effort: Pulmonary effort is normal.      Breath sounds: Normal breath sounds. No decreased breath sounds, wheezing, rhonchi or rales. Abdominal:      General: Bowel sounds are normal.      Palpations: Abdomen is soft. Tenderness: There is no tenderness. There is no right CVA tenderness or left CVA tenderness. Hernia: No hernia is present. Lymphadenopathy:      Cervical: No cervical adenopathy. Skin:     General: Skin is warm. Capillary Refill: Capillary refill takes less than 2 seconds. Neurological:      General: No focal deficit present.       Mental Status: She is alert and oriented to person, place, and time. Cranial Nerves: No cranial nerve deficit. Sensory: No sensory deficit. Coordination: Coordination normal.      Gait: Gait normal.   Psychiatric:         Attention and Perception: Attention normal.         Mood and Affect: Mood is anxious. Mood is not depressed. Speech: Speech normal.         Behavior: Behavior normal.         Thought Content: Thought content normal.         Judgment: Judgment normal.         Assessment:      Diagnosis Orders   1. Generalized anxiety disorder  Ambulatory referral to Behavioral Health   2. Nausea         Plan:     Proceed with continue hydroxyzine as needed  Recommend referral to behavioral health  Healthy diet and regular exercise recommended  Good sleep hygiene recommended  Kaopectate as needed for upset stomach/nausea  Call with concerns          Rebeca received counseling on the following healthy behaviors: nutrition, exercise and medication adherence  Reviewed prior labs and health maintenance. Continue current medications, diet and exercise. Discussed use, benefit, and side effects of prescribed medications. Barriers to medication compliance addressed. Patient given educational materials - see patient instructions. All patient questions answered. Patient voiced understanding.            Electronically signed by Laury Brown MD on 1/2/2020 at 10:21 PM

## 2020-01-03 ENCOUNTER — TELEPHONE (OUTPATIENT)
Dept: FAMILY MEDICINE CLINIC | Age: 50
End: 2020-01-03

## 2020-01-03 RX ORDER — DICYCLOMINE HYDROCHLORIDE 10 MG/1
10 CAPSULE ORAL 4 TIMES DAILY
Qty: 120 CAPSULE | Refills: 0 | Status: SHIPPED | OUTPATIENT
Start: 2020-01-03 | End: 2020-07-16 | Stop reason: ALTCHOICE

## 2020-01-03 NOTE — TELEPHONE ENCOUNTER
dicyclomine 10 mg 4 times daily sent to pharmacy as requested. Please let patient know and close encounter when complete.

## 2020-01-14 ENCOUNTER — OFFICE VISIT (OUTPATIENT)
Dept: PSYCHOLOGY | Age: 50
End: 2020-01-14
Payer: OTHER GOVERNMENT

## 2020-01-14 PROCEDURE — 90791 PSYCH DIAGNOSTIC EVALUATION: CPT | Performed by: SOCIAL WORKER

## 2020-01-14 NOTE — PROGRESS NOTES
ADULT BEHAVIORAL HEALTH ASSESSMENT  AgustinaCHRIS Cary  Licensed Independent      Visit Date: 1/14/2020   Time of appointment: 10am   Time spent with Patient: 55 minutes. This is patient's first appointment. Reason for Consult:  Stress     PCP: Emeirta Murphy MD      Pt provided informed consent for the behavioral health program. Discussed with patient model of service to include the limits of confidentiality (i.e. abuse reporting, suicide intervention, etc.) and short-term intervention focused approach. Pt indicated understanding. PRESENTING PROBLEM AND HISTORY  Yvon Longoria is a 54 y.o. female who presents for new evaluation and treatment of stress. She reports the following symptoms: depressed mood, decreased sleep , excessive crying, fatigue/lack of energy, feeling nervous, anxious, or on edge, excessive anxiety and worry about specific stressors and difficulty with attention/concentration. Symptoms are causing impairment in her activities of daily living and family relationships. Onset of symptoms was approximately several weeks ago. Symptoms occur daily and have been rapidly worsening since onset. Stressors contributing to the presenting problem include:  has been stressed for several months about a new job, and recently informed pt that he may be losing the job. His mental health has been deteriorating and pt was able to get him admitted to [facility] where he still is. Pt reports he isn't talking with her about how he is doing. She does not work and has no other income other than his. Yvon Longoria reports that her main concern and focus for the referral to [facility] is to help her manage the stress which is causing increased depression and anxiety.     CURRENT MEDICATIONS    Current Outpatient Medications:     dicyclomine (BENTYL) 10 MG capsule, Take 1 capsule by mouth 4 times daily, Disp: 120 capsule, Rfl: 0    acetaZOLAMIDE (DIAMOX) 125 MG tablet, Take tablets twice a day starting 24 hours before changing altitude and continuing until return from vacation. , Disp: 14 tablet, Rfl: 0    omeprazole (PRILOSEC) 20 MG delayed release capsule, Take 1 capsule by mouth 2 times daily, Disp: 60 capsule, Rfl: 5    metoprolol tartrate (LOPRESSOR) 25 MG tablet, Take 1 tablet by mouth 2 times daily as needed (palpitations), Disp: 30 tablet, Rfl: 5     FAMILY MEDICAL/MH HISTORY   Her family history includes No Known Problems in her father and mother. PATIENT MENTAL HEALTH HISTORY  Ruperto Briscoe reports a previous diagnosis of anxiety. Previous treatment includes medication only. She is currently taking hydroxyzine  and complains of the following medication side effects: none. Pt reports difficulty sleeping is most severely impacting her at this point. Zoila Kirby is currently living with her  (who is in the hospital at this time), and 13year old twins. She and  also have a 23year old who is in college. 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 Support system: Pt does report receiving support from family, especially including her mother. Her sister, who lives out of [facility], is going to come stay with pt for awhile to assist her. Hoahaoism/Spirituality: Rose Mary beltran. DRUG AND ALCOHOL CURRENT USE/HISTORY  TOBACCO:  She reports that she has never smoked. She has never used smokeless tobacco.  ALCOHOL:  She has no history on file for alcohol. OTHER SUBSTANCES: She has no history on file for drug. MENTAL STATUS EXAM  Mood was anxious and sad with tearful affect. Suicidal ideation was denied. Homicidal ideation was denied. Hygiene was good . Dress was neat. Behavior was Anxious  Attitude was Cooperative. Eye-contact was good. Speech: rate - WNL, rhythm -  WNL, volume - WNL  Verbalizations were goal directed.   Thought processes were intact and goal-oriented without evidence of delusions,

## 2020-02-11 RX ORDER — HYDROXYZINE HYDROCHLORIDE 25 MG/1
25 TABLET, FILM COATED ORAL EVERY 8 HOURS PRN
Qty: 30 TABLET | Refills: 0 | Status: SHIPPED | OUTPATIENT
Start: 2020-02-11 | End: 2020-03-12

## 2020-02-27 ENCOUNTER — OFFICE VISIT (OUTPATIENT)
Dept: FAMILY MEDICINE CLINIC | Age: 50
End: 2020-02-27
Payer: OTHER GOVERNMENT

## 2020-02-27 VITALS
TEMPERATURE: 98.1 F | WEIGHT: 165 LBS | RESPIRATION RATE: 20 BRPM | SYSTOLIC BLOOD PRESSURE: 116 MMHG | DIASTOLIC BLOOD PRESSURE: 70 MMHG | BODY MASS INDEX: 26.63 KG/M2

## 2020-02-27 PROCEDURE — 99214 OFFICE O/P EST MOD 30 MIN: CPT | Performed by: PEDIATRICS

## 2020-02-27 ASSESSMENT — ENCOUNTER SYMPTOMS
SINUS PRESSURE: 0
DIARRHEA: 0
TROUBLE SWALLOWING: 0
BLOOD IN STOOL: 0
EYE REDNESS: 0
VOMITING: 0
CHEST TIGHTNESS: 0
BACK PAIN: 0
EYE PAIN: 0
SHORTNESS OF BREATH: 0
EYE DISCHARGE: 0
WHEEZING: 0
COUGH: 0
COLOR CHANGE: 0
SORE THROAT: 0
CONSTIPATION: 0
ABDOMINAL PAIN: 0
NAUSEA: 1
RHINORRHEA: 0

## 2020-02-27 NOTE — PROGRESS NOTES
Subjective:      Patient ID: Maye Sterling is a 54 y.o. female. Visit Information    Have you changed or started any medications since your last visit including any over-the-counter medicines, vitamins, or herbal medicines? no   Are you having any side effects from any of your medications? -  no  Have you stopped taking any of your medications? Is so, why? -  no    Have you seen any other physician or provider since your last visit? No  Have you had any other diagnostic tests since your last visit? No  Have you been seen in the emergency room and/or had an admission to a hospital since we last saw you? No  Have you had your routine dental cleaning in the past 6 months? yes -     Have you activated your [employer] account? If not, what are your barriers?  Yes     Patient Care Team:  Peewee Crowder MD as PCP - Jihan Shultz MD as PCP - [facility] Provider    Medical History Review  Past Medical, Family, and Social History reviewed and does contribute to the patient presenting condition    Health Maintenance   Topic Date Due    Potassium monitoring  05/31/2020    Creatinine monitoring  05/31/2020    Cervical cancer screen  11/13/2020    Shingles Vaccine (1 of 2) 03/21/2022    Lipid screen  11/08/2022    DTaP/Tdap/Td vaccine (2 - Td) 01/29/2025    Flu vaccine  Completed    HIV screen  Completed    Hepatitis A vaccine  Aged Out    Hepatitis B vaccine  Aged Out    Hib vaccine  Aged Out    Meningococcal (ACWY) vaccine  Aged Out    Pneumococcal 0-64 years Vaccine  Aged Out       [facility] Scores 1/2/2020 12/20/2018 8/23/2017   PHQ2 Score 2 0 0   PHQ9 Score 2 0 0     Interpretation of Total Score DepressionSeverity: 1-4 = Minimal depression, 5-9 = Mild depression, 10-14 = Moderate depression, 15-19 = Moderately severe depression, 20-27 = Severe depression    Current Outpatient Medications   Medication Sig Dispense Refill    citalopram (CELEXA) 10 MG tablet Take 1 tablet by mouth daily 90 tablet 1    hydrOXYzine (ATARAX) 25 MG tablet Take 1 tablet by mouth every 8 hours as needed for Itching or Anxiety (PRN to treat Anxiety) 30 tablet 0    dicyclomine (BENTYL) 10 MG capsule Take 1 capsule by mouth 4 times daily 120 capsule 0    acetaZOLAMIDE (DIAMOX) 125 MG tablet Take tablets twice a day starting 24 hours before changing altitude and continuing until return from vacation. 14 tablet 0    omeprazole (PRILOSEC) 20 MG delayed release capsule Take 1 capsule by mouth 2 times daily 60 capsule 5    metoprolol tartrate (LOPRESSOR) 25 MG tablet Take 1 tablet by mouth 2 times daily as needed (palpitations) 30 tablet 5     No current facility-administered medications for this visit. Anxiety: Patient complains of evaluation of anxiety disorder. She has the following anxiety symptoms: irritable, racing thoughts. Onset of symptoms was approximately several years ago, gradually improving since that time. She denies current suicidal and homicidal ideation. Family history significant for no psychiatric illness. Possible organic causes contributing are: none. Risk factors: none Previous treatment includes Celexa and none. She complains of the following side effects from the treatment: none. HPI    Patient presents today for routine follow-up of anxiety and depression. Patient was seen at the beginning of January with concerns of worsening anxiety secondary to her  possibly losing his job. She has been taking hydroxyzine to help with this anxiety and this does work well. She only uses this as needed for severe anxiety. She did call in about a week ago because she was having worsening depression symptoms as well. She asked to be restarted on Celexa which she has taken in the past.  She did restart this about 1 week ago and has not noticed much difference in her symptoms as of yet. I did advise her to expect about 3 to 4 weeks before she does notice improvement.   She did see a Abdomen is soft. Tenderness: There is no abdominal tenderness. There is no right CVA tenderness or left CVA tenderness. Hernia: No hernia is present. Musculoskeletal:      Right lower leg: No edema. Left lower leg: No edema. Lymphadenopathy:      Cervical: No cervical adenopathy. Skin:     General: Skin is warm. Capillary Refill: Capillary refill takes less than 2 seconds. Neurological:      General: No focal deficit present. Mental Status: She is alert and oriented to person, place, and time. Cranial Nerves: No cranial nerve deficit. Sensory: No sensory deficit. Coordination: Coordination normal.      Gait: Gait normal.   Psychiatric:         Attention and Perception: Attention normal.         Mood and Affect: Mood is anxious and depressed. Speech: Speech normal.         Behavior: Behavior normal.         Thought Content: Thought content normal.         Judgment: Judgment normal.         Assessment:      Diagnosis Orders   1. Adjustment disorder with mixed anxiety and depressed mood     2. Mild episode of recurrent major depressive disorder ([facility] [address]     3. Generalized anxiety disorder         Plan:     Proceed with continue Celexa 10 mg once daily and adjust as needed if no improvement in 3-4 weeks  Continue hydroxyzine as needed  Daily exercise and healthy diet encouraged  Good sleep hygiene recommended  Over-the-counter medications as needed for nausea  Consider Zofran if nausea worsens  Call with concerns      Rebeca received counseling on the following healthy behaviors: nutrition, exercise and medication adherence  Reviewed prior labs and health maintenance. Continue current medications, diet and exercise. Discussed use, benefit, and side effects of prescribed medications. Barriers to medication compliance addressed. Patient given educational materials - see patient instructions. All patient questions answered. Patient voiced understanding.

## 2020-06-09 ENCOUNTER — TELEPHONE (OUTPATIENT)
Dept: FAMILY MEDICINE CLINIC | Age: 50
End: 2020-06-09

## 2020-06-09 NOTE — TELEPHONE ENCOUNTER
LILIANAI:  Patient is contacting the office today because she was given metoprolol for palpitations and has not had to take one in quite awhile but today she stated that you could literally see her pulse through the vein in her neck. Writer was advised by Leena Izaguirre CNP to advise the patient to go to the ED for evaluation to ensure there is nothing more going on related to her heart.

## 2020-06-11 ENCOUNTER — HOSPITAL ENCOUNTER (OUTPATIENT)
Age: 50
Setting detail: SPECIMEN
Discharge: HOME | End: 2020-06-11
Payer: OTHER GOVERNMENT

## 2020-06-11 ENCOUNTER — OFFICE VISIT (OUTPATIENT)
Dept: FAMILY MEDICINE CLINIC | Age: 50
End: 2020-06-11
Payer: OTHER GOVERNMENT

## 2020-06-11 VITALS
RESPIRATION RATE: 14 BRPM | WEIGHT: 152 LBS | HEART RATE: 86 BPM | TEMPERATURE: 96.9 F | BODY MASS INDEX: 24.53 KG/M2 | DIASTOLIC BLOOD PRESSURE: 74 MMHG | SYSTOLIC BLOOD PRESSURE: 108 MMHG

## 2020-06-11 LAB
THYROXINE, FREE: 1.03 NG/DL (ref 0.93–1.7)
TSH SERPL DL<=0.05 MIU/L-ACNC: 1.43 MIU/L (ref 0.3–5)

## 2020-06-11 PROCEDURE — 99214 OFFICE O/P EST MOD 30 MIN: CPT | Performed by: PEDIATRICS

## 2020-06-11 ASSESSMENT — ENCOUNTER SYMPTOMS
CHEST TIGHTNESS: 0
EYE PAIN: 0
COUGH: 0
SORE THROAT: 0
EYE REDNESS: 0
DIARRHEA: 0
CONSTIPATION: 0
ABDOMINAL PAIN: 0
EYE DISCHARGE: 0
RHINORRHEA: 0
STRIDOR: 0
VOMITING: 0
COLOR CHANGE: 0
SHORTNESS OF BREATH: 0
NAUSEA: 0
WHEEZING: 0

## 2020-08-14 PROBLEM — I47.29 NSVT (NONSUSTAINED VENTRICULAR TACHYCARDIA): Status: ACTIVE | Noted: 2020-08-14

## 2020-08-14 PROBLEM — I47.10 SVT (SUPRAVENTRICULAR TACHYCARDIA) (HCC): Status: ACTIVE | Noted: 2020-08-14

## 2020-08-14 PROBLEM — I49.3 PVC (PREMATURE VENTRICULAR CONTRACTION): Status: RESOLVED | Noted: 2020-08-14 | Resolved: 2020-08-14

## 2020-08-14 PROBLEM — I47.1 SVT (SUPRAVENTRICULAR TACHYCARDIA) (HCC): Status: ACTIVE | Noted: 2020-08-14

## 2020-08-14 PROBLEM — I49.3 PVC (PREMATURE VENTRICULAR CONTRACTION): Status: ACTIVE | Noted: 2020-08-14

## 2021-02-18 ENCOUNTER — HOSPITAL ENCOUNTER (OUTPATIENT)
Age: 51
Setting detail: SPECIMEN
Discharge: HOME | End: 2021-02-18
Payer: OTHER GOVERNMENT

## 2021-02-18 ENCOUNTER — OFFICE VISIT (OUTPATIENT)
Dept: FAMILY MEDICINE CLINIC | Age: 51
End: 2021-02-18
Payer: OTHER GOVERNMENT

## 2021-02-18 VITALS
WEIGHT: 128 LBS | TEMPERATURE: 98.3 F | DIASTOLIC BLOOD PRESSURE: 76 MMHG | SYSTOLIC BLOOD PRESSURE: 106 MMHG | BODY MASS INDEX: 20.57 KG/M2 | HEIGHT: 66 IN | HEART RATE: 81 BPM | RESPIRATION RATE: 14 BRPM

## 2021-02-18 DIAGNOSIS — Z12.39 ENCOUNTER FOR SCREENING BREAST EXAMINATION AND DISCUSSION OF BREAST SELF EXAMINATION: ICD-10-CM

## 2021-02-18 DIAGNOSIS — N89.8 VAGINAL ITCHING: ICD-10-CM

## 2021-02-18 DIAGNOSIS — F52.8 EXCESSIVE SEXUAL DRIVE: ICD-10-CM

## 2021-02-18 DIAGNOSIS — R23.2 HOT FLASHES: ICD-10-CM

## 2021-02-18 DIAGNOSIS — Z12.89 ENCOUNTER FOR SCREENING FOR MALIGNANT NEOPLASM OF OTHER SITES: ICD-10-CM

## 2021-02-18 DIAGNOSIS — Z01.419 ENCOUNTER FOR ROUTINE GYNECOLOGICAL EXAMINATION WITH PAPANICOLAOU SMEAR OF CERVIX: ICD-10-CM

## 2021-02-18 DIAGNOSIS — R63.4 WEIGHT LOSS: Primary | ICD-10-CM

## 2021-02-18 DIAGNOSIS — N92.6 IRREGULAR MENSTRUAL CYCLE: ICD-10-CM

## 2021-02-18 DIAGNOSIS — Z87.440 RECENT URINARY TRACT INFECTION: ICD-10-CM

## 2021-02-18 DIAGNOSIS — Z83.49 FAMILY HISTORY OF THYROID DISORDER: ICD-10-CM

## 2021-02-18 DIAGNOSIS — G47.9 SLEEP DISTURBANCE: ICD-10-CM

## 2021-02-18 DIAGNOSIS — R63.4 WEIGHT LOSS: ICD-10-CM

## 2021-02-18 LAB
-: NORMAL
ALBUMIN SERPL-MCNC: 4 G/DL (ref 3.5–5.2)
ALBUMIN/GLOBULIN RATIO: 1.5 (ref 1–2.5)
ALP BLD-CCNC: 61 U/L (ref 35–104)
ALT SERPL-CCNC: 14 U/L (ref 5–33)
AMORPHOUS: NORMAL
ANION GAP SERPL CALCULATED.3IONS-SCNC: 8 MMOL/L (ref 9–17)
AST SERPL-CCNC: 18 U/L
BACTERIA: NORMAL
BILIRUB SERPL-MCNC: 0.22 MG/DL (ref 0.3–1.2)
BILIRUBIN URINE: NEGATIVE
BUN BLDV-MCNC: 11 MG/DL (ref 6–20)
BUN/CREAT BLD: ABNORMAL (ref 9–20)
CALCIUM SERPL-MCNC: 8.7 MG/DL (ref 8.6–10.4)
CASTS UA: NORMAL /LPF (ref 0–8)
CHLORIDE BLD-SCNC: 105 MMOL/L (ref 98–107)
CO2: 27 MMOL/L (ref 20–31)
COLOR: YELLOW
COMMENT UA: ABNORMAL
CREAT SERPL-MCNC: 0.65 MG/DL (ref 0.5–0.9)
CRYSTALS, UA: NORMAL /HPF
DIRECT EXAM: ABNORMAL
EPITHELIAL CELLS UA: NORMAL /HPF (ref 0–5)
ESTRADIOL LEVEL: 230 PG/ML (ref 27–314)
FOLLICLE STIMULATING HORMONE: 24.2 U/L (ref 1.7–21.5)
GFR AFRICAN AMERICAN: >60 ML/MIN
GFR NON-AFRICAN AMERICAN: >60 ML/MIN
GFR SERPL CREATININE-BSD FRML MDRD: ABNORMAL ML/MIN/{1.73_M2}
GFR SERPL CREATININE-BSD FRML MDRD: ABNORMAL ML/MIN/{1.73_M2}
GLUCOSE BLD-MCNC: 76 MG/DL (ref 70–99)
GLUCOSE URINE: NEGATIVE
KETONES, URINE: NEGATIVE
LEUKOCYTE ESTERASE, URINE: ABNORMAL
LH: 20.6 U/L (ref 1–95.6)
Lab: ABNORMAL
MUCUS: NORMAL
NITRITE, URINE: NEGATIVE
OTHER OBSERVATIONS UA: NORMAL
PH UA: 8 (ref 5–8)
POTASSIUM SERPL-SCNC: 4.1 MMOL/L (ref 3.7–5.3)
PROTEIN UA: NEGATIVE
RBC UA: NORMAL /HPF (ref 0–4)
RENAL EPITHELIAL, UA: NORMAL /HPF
SODIUM BLD-SCNC: 140 MMOL/L (ref 135–144)
SPECIFIC GRAVITY UA: 1.01 (ref 1–1.03)
SPECIMEN DESCRIPTION: ABNORMAL
TESTOSTERONE TOTAL: 16 NG/DL (ref 20–70)
THYROXINE, FREE: 0.97 NG/DL (ref 0.93–1.7)
TOTAL PROTEIN: 6.6 G/DL (ref 6.4–8.3)
TRICHOMONAS: NORMAL
TSH SERPL DL<=0.05 MIU/L-ACNC: 1.26 MIU/L (ref 0.3–5)
TURBIDITY: CLEAR
URINE HGB: NEGATIVE
UROBILINOGEN, URINE: NORMAL
WBC UA: NORMAL /HPF (ref 0–5)
YEAST: NORMAL

## 2021-02-18 PROCEDURE — 99214 OFFICE O/P EST MOD 30 MIN: CPT | Performed by: PEDIATRICS

## 2021-02-18 ASSESSMENT — ENCOUNTER SYMPTOMS
NAUSEA: 0
COLOR CHANGE: 0
STRIDOR: 0
ABDOMINAL PAIN: 0
CONSTIPATION: 0
PHOTOPHOBIA: 0
EYE REDNESS: 0
BLOOD IN STOOL: 0
DIARRHEA: 1
WHEEZING: 0
EYE PAIN: 0
SHORTNESS OF BREATH: 0
COUGH: 0
EYE DISCHARGE: 0

## 2021-02-18 ASSESSMENT — PATIENT HEALTH QUESTIONNAIRE - PHQ9
2. FEELING DOWN, DEPRESSED OR HOPELESS: 0
SUM OF ALL RESPONSES TO PHQ QUESTIONS 1-9: 0
SUM OF ALL RESPONSES TO PHQ9 QUESTIONS 1 & 2: 0

## 2021-02-18 NOTE — PROGRESS NOTES
Macie Garcia (:  1970) is a 52 y.o. female,Established patient, here for evaluation of the following chief complaint(s): Other (weight loss)      ASSESSMENT/PLAN:  1. Weight loss  -     TSH without Reflex; Future  -     T4, Free; Future  -     Thyroid Antibodies; Future  -     Follicle Stimulating Hormone; Future  -     Luteinizing Hormone; Future  -     Comprehensive Metabolic Panel; Future  -     Testosterone; Future  -     Estradiol; Future  2. Sleep disturbance  3. Hot flashes  -     TSH without Reflex; Future  -     T4, Free; Future  -     Thyroid Antibodies; Future  -     Follicle Stimulating Hormone; Future  -     Luteinizing Hormone; Future  -     Comprehensive Metabolic Panel; Future  -     Testosterone; Future  -     Estradiol; Future  4. Irregular menstrual cycle  -     TSH without Reflex; Future  -     T4, Free; Future  -     Thyroid Antibodies; Future  -     Follicle Stimulating Hormone; Future  -     Luteinizing Hormone; Future  -     Comprehensive Metabolic Panel; Future  -     Testosterone; Future  -     Estradiol; Future  -     Urinalysis Reflex to Culture; Future  5. Excessive sexual drive  -     TSH without Reflex; Future  -     Follicle Stimulating Hormone; Future  -     Luteinizing Hormone; Future  -     Testosterone; Future  -     Estradiol; Future  6. Family history of thyroid disorder  -     TSH without Reflex; Future  -     T4, Free; Future  -     Thyroid Antibodies; Future  7. Vaginal itching  -     Vaginitis DNA Probe; Future  -     Chlamydia/GC DNA, Thin Prep; Future  -     Urinalysis Reflex to Culture; Future  8. Recent urinary tract infection  -     Urinalysis Reflex to Culture; Future  9. Encounter for routine gynecological examination with Papanicolaou smear of cervix  -     PAP Smear; Future  -     ND OBTAINING SCREEN PAP SMEAR  10. Encounter for screening for malignant neoplasm of other sites  11.  Encounter for screening breast examination and discussion of breast self examination      Proceed with obtain labs as ordered  Keep dietary log of caloric intake  Recommend 1800 to 2000 kelly/day  Regular exercise encouraged  Good sleep hygiene recommended  Obtain BV panel  Obtain urinalysis with reflex to culture  Obtain Pap smear  Recommend monthly self breast exams  Obtain mammogram  Call with concerns        Return for routine follow up or sooner if problems. SUBJECTIVE/OBJECTIVE:    HPI    Patient presents today for evaluation of several concerns. First of all she has had significant weight loss over the course of the last year. She has lost approximately almost 50 pounds since January 2020. She is now  (working on divorce) from her  after lots of stress in her marriage. She started working at [employer] last year when she  from her . 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 She works from 5 AM until 4:15 PM and she is constantly moving. She states that she does eat some snacks during the day at work and then often does not feel hungry at nighttime. She does not eat a lot of food but she does try to at least eat small amounts. She also started a workout program where she is wearing a weight vest and she is doing some weight training exercises. She does feel as though her weight loss has been secondary to changes in her lifestyle but she also wanted to make sure that things were okay because she has had some other symptoms. She has been experiencing some sleep disturbance and hot flashes. She states that she will fall asleep and wake up at approximately 2 or 3 in the middle of the night and she is very hot. She will stick her foot out of the covers and then feel very cold. She has also had somewhat irregular menstrual cycles.  She states that she skipped cycles in July and August 2020 and then in September had a 2-day cycle that was quite spotty. She then had a similar cycle in October with 3 spotty days and no cycle in November. She also had a cycle in December that was normal and then another 3 days of spotty cycle in January with another small cycle at the beginning of February. She is sexually active with a new partner but has had her tubes tied for birth control. She is not using condoms and she denies pregnancy. She does notice an increase in sexual drive. She states that she is having intercourse about 2-3 times per week and does feel as though this is excessive because in her previous relationship she only had intercourse once every few months. I told her that this does seem quite normal but with her other symptoms and her weight loss it is best to have laboratory evaluation to ensure there is nothing significantly abnormal with her hormones. She does also have a history of thyroid disorder in her sister. She also has some vaginal itching that she has noticed recently. She did have a recent urinary tract infection that was treated at the [facility] with Macrobid twice daily for 7 days. She states that her symptoms were similar and they did improve after the antibiotic but have started again. She is having fairly normal bowel movements but does occasionally have some loose bowel movements. She does feels though this is related to her diet. She is due for routine Pap and breast exams. Since I am performing a pelvic exam today, I will obtain her pap. A mammogram order was printed for her previously and she was given this today. cmw        Review of Systems   Constitutional: Positive for appetite change and unexpected weight change. Negative for fatigue and fever. Eyes: Negative for photophobia, pain, discharge and redness. Respiratory: Negative for cough, shortness of breath, wheezing and stridor.     Cardiovascular: Positive for leg swelling (occasional - chronic). Negative for chest pain and palpitations. Gastrointestinal: Positive for diarrhea (some occasional loose stool). Negative for abdominal pain, blood in stool, constipation and nausea. Endocrine: Negative for polydipsia, polyphagia and polyuria. Hot flashes, increased sex drive  Family history of thyroid disorder in her sister   Genitourinary: Positive for menstrual problem and vaginal discharge. Negative for dysuria, vaginal bleeding and vaginal pain. Recent UTI - she does she me a culture that showed less than 10,000 CFU so I did tell her that this was not a true infection. Musculoskeletal: Negative for arthralgias. Skin: Negative for color change and rash. Allergic/Immunologic: Negative for immunocompromised state. Neurological: Negative for dizziness, syncope, weakness, light-headedness and headaches. Hematological: Negative for adenopathy. Does not bruise/bleed easily. Psychiatric/Behavioral: Positive for sleep disturbance. Negative for dysphoric mood. The patient is not nervous/anxious. Physical Exam  Vitals signs and nursing note reviewed. Exam conducted with a chaperone present. Constitutional:       General: She is not in acute distress. Appearance: Normal appearance. She is well-developed and normal weight. She is not ill-appearing, toxic-appearing or diaphoretic. Comments: obese   HENT:      Head: Normocephalic. Right Ear: Tympanic membrane, ear canal and external ear normal. There is no impacted cerumen. Left Ear: Tympanic membrane, ear canal and external ear normal. There is no impacted cerumen. Nose: Nose normal. No congestion or rhinorrhea. Mouth/Throat:      Mouth: Mucous membranes are moist.      Pharynx: No oropharyngeal exudate. Eyes:      General: No scleral icterus. Right eye: No discharge. Left eye: No discharge. Extraocular Movements: Extraocular movements intact.       Conjunctiva/sclera: Conjunctivae normal.      Pupils: Pupils are equal, round, and reactive to light. Neck:      Musculoskeletal: Normal range of motion and neck supple. No neck rigidity or muscular tenderness. Vascular: No JVD. Cardiovascular:      Rate and Rhythm: Normal rate. Pulses: Normal pulses. Heart sounds: Normal heart sounds. No murmur. Pulmonary:      Effort: Pulmonary effort is normal.      Breath sounds: Normal breath sounds. No decreased breath sounds, wheezing, rhonchi or rales. Chest:      Breasts:         Right: No inverted nipple, mass, nipple discharge, skin change or tenderness. Left: No inverted nipple, mass, nipple discharge, skin change or tenderness. Abdominal:      General: Bowel sounds are normal.      Palpations: Abdomen is soft. Tenderness: There is no abdominal tenderness. There is no right CVA tenderness or left CVA tenderness. Genitourinary:     General: Normal vulva. Exam position: Supine. Labia:         Right: No rash, tenderness, lesion or injury. Left: No rash, tenderness, lesion or injury. Vagina: Normal.      Cervix: Normal.      Uterus: Normal.       Adnexa: Right adnexa normal and left adnexa normal.        Right: No mass, tenderness or fullness. Left: No mass, tenderness or fullness. Musculoskeletal:      Right lower leg: Edema (scant) present. Left lower leg: Edema (scant) present. Lymphadenopathy:      Cervical: No cervical adenopathy. Lower Body: No right inguinal adenopathy. No left inguinal adenopathy. Skin:     General: Skin is warm. Capillary Refill: Capillary refill takes less than 2 seconds. Comments: She has multiple solar lentigenes on the face, arms and legs   Neurological:      General: No focal deficit present. Mental Status: She is alert and oriented to person, place, and time.    Psychiatric:         Mood and Affect: Mood normal.         Behavior: Behavior normal. Thought Content: Thought content normal.         Judgment: Judgment normal.           On this date 02/18/21 I have spent greater than 30 minutes reviewing previous notes, test results and face to face with the patient discussing the diagnosis and importance of compliance with the treatment plan as well as documenting on the day of the visit. An electronic signature was used to authenticate this note.     --Richard Giron MD

## 2021-02-19 LAB
CHLAMYDIA BY THIN PREP: NEGATIVE
N. GONORRHOEAE DNA, THIN PREP: NEGATIVE
THYROGLOBULIN AB: <12 IU/ML (ref 0–40)
THYROID PEROXIDASE (TPO) AB: <4 IU/ML (ref 0–25)

## 2021-02-19 RX ORDER — FLUCONAZOLE 150 MG/1
150 TABLET ORAL DAILY
Qty: 3 TABLET | Refills: 0 | Status: SHIPPED | OUTPATIENT
Start: 2021-02-19 | End: 2021-02-22

## 2021-02-20 LAB
HPV SAMPLE: ABNORMAL
HPV, GENOTYPE 16: NOT DETECTED
HPV, GENOTYPE 18: NOT DETECTED
HPV, HIGH RISK OTHER: DETECTED
HPV, INTERPRETATION: ABNORMAL
SPECIMEN DESCRIPTION: ABNORMAL

## 2021-02-26 LAB — CYTOLOGY REPORT: NORMAL

## 2021-03-01 ENCOUNTER — TELEPHONE (OUTPATIENT)
Dept: FAMILY MEDICINE CLINIC | Age: 51
End: 2021-03-01

## 2021-03-01 DIAGNOSIS — R87.612 LGSIL ON PAP SMEAR OF CERVIX: ICD-10-CM

## 2021-03-01 DIAGNOSIS — R87.629 ABNORMAL THINPREP PAP TEST OF VAGINA: Primary | ICD-10-CM

## 2021-03-01 DIAGNOSIS — B97.7 HIGH RISK HPV INFECTION: ICD-10-CM

## 2021-03-16 ENCOUNTER — HOSPITAL ENCOUNTER (OUTPATIENT)
Age: 51
Setting detail: SPECIMEN
Discharge: HOME | End: 2021-03-16
Payer: OTHER GOVERNMENT

## 2021-03-16 ENCOUNTER — OFFICE VISIT (OUTPATIENT)
Dept: OBGYN CLINIC | Age: 51
End: 2021-03-16
Payer: OTHER GOVERNMENT

## 2021-03-16 VITALS
HEART RATE: 100 BPM | BODY MASS INDEX: 20.41 KG/M2 | WEIGHT: 127 LBS | HEIGHT: 66 IN | DIASTOLIC BLOOD PRESSURE: 70 MMHG | SYSTOLIC BLOOD PRESSURE: 118 MMHG

## 2021-03-16 DIAGNOSIS — B97.7 HPV IN FEMALE: ICD-10-CM

## 2021-03-16 DIAGNOSIS — Z76.89 ENCOUNTER TO ESTABLISH CARE WITH NEW DOCTOR: ICD-10-CM

## 2021-03-16 DIAGNOSIS — R87.612 LOW GRADE SQUAMOUS INTRAEPITHELIAL LESION ON CYTOLOGIC SMEAR OF CERVIX (LGSIL): Primary | ICD-10-CM

## 2021-03-16 PROCEDURE — 57454 BX/CURETT OF CERVIX W/SCOPE: CPT | Performed by: STUDENT IN AN ORGANIZED HEALTH CARE EDUCATION/TRAINING PROGRAM

## 2021-03-16 NOTE — PATIENT INSTRUCTIONS
Patient Education        Human Papillomavirus (HPV): Care Instructions  Overview  The human papillomavirus (HPV) is a very common virus. There are many types of HPV. Some types cause the common skin wart. Other types cause genital warts, which can be spread by sexual contact. Some types can increase the risk for certain cancers, such as cervical or anal cancer. Having one type of HPV doesn't lead to having another type. Many women who have HPV may not know that they're infected until it's found with a cervical cancer screening test, such as an HPV test.  If an HPV screening test finds that you have the types of HPV that might lead to cancer, your doctor may suggest more tests. This doesn't mean you'll get cancer. But it means that you may have an increased risk. Abnormal cell changes caused by HPV often go away on their own. If they don't, they can be treated. Follow-up care is a key part of your treatment and safety. Be sure to make and go to all appointments, and call your doctor if you are having problems. It's also a good idea to know your test results and keep a list of the medicines you take. How can you care for yourself at home? · Do not smoke. Smoking increases the risk for cervical problems and cervical cancer. If you need help quitting, talk to your doctor about stop-smoking programs and medicines. These can increase your chances of quitting for good. · Use condoms every time you have sex. Use them from the beginning to the end of sexual contact. · Be sure to tell your sexual partner or partners that you have HPV. Even if you do not have symptoms, you can still pass HPV to others. · Having one sex partner (who does not have STIs and does not have sex with anyone else) can decrease your risk of getting STIs. When should you call for help?   Watch closely for changes in your health, and be sure to contact your doctor if:    · You have vaginal pain during or after sex.     · You have vaginal bleeding when you are not in your menstrual period. Where can you learn more? Go to https://chpepiceweb.[facility]. org and sign in to your [employer] account. Enter F690 in the InEnTec box to learn more about \"Human Papillomavirus (HPV): Care Instructions. \"     If you do not have an account, please click on the \"Sign Up Now\" link. Current as of: February 26, 2020               Content Version: 12.8  © 2006-2021 [employer]. Care instructions adapted under license by [facility] ([facility]). If you have questions about a medical condition or this instruction, always ask your healthcare professional. [address] any warranty or liability for your use of this information.

## 2021-03-16 NOTE — PROGRESS NOTES
[address] [facility]   [address]  [facility], [address]    Procedure Note    Maynor Talley  3/16/2021                       Primary Care Physician: Sandra Britt MD      Subjective:   Maynor Fishmaning 52 y.o. female No obstetric history on file. is here for previously scheduled Colposcopy, biopsy and ECC due to history of LSIL with + Other HRHPV. No LMP recorded. . She has no complaints today. Vitals:   Blood pressure 118/70, pulse 100, height 5' 5.5\" (1.664 m), weight 127 lb (57.6 kg), not currently breastfeeding. Procedure:  Colposcopy, biopsy and ECC    Indications: LSIL with + Other HRHPV. Procedure Details: The patient was counseled on the procedure. Risks, benefits and alternatives were reviewed. The patient is aware that this is diagnostic and not curative and a second procedure may be needed. A consent was reviewed and obtained. Colposcopy w/ Biopsy and Endocervical Curettage  A sterile speculum was placed into the vagina and the cervix was identified. The colposcope was positioned and the cervix was examined revealing no visible lesions. Green light was used to evaluate the vessels, revealing  abnormal vessels noted at 7 o'clock. Acetoacetic acid was applied to the cervix, revealing no visible lesions. Lugol's Iodine was applied to the cervix revealing  abnormal vessels noted at 7 o'clock. The exam was considered to be satisfactory with the transformation zone visualized. Biopsy was taken at [address] [place] [address]. Silver nitrate sticks were used for hemostasis. Good hemostasis was observed. Biopsy tissue was sent to pathology. Endocervical curettage was then performed and tissue collected was sent to pathology. Impression: Satisfactory colposcopy abnormal vessels noted at 7 o'clock    The patient tolerated the procedure well. Post procedure restrictions were reviewed and given to the patient.   All counts and instruments were correct at the end of the procedure. Lab:  Pregnancy Test:  No results found for: PREGTESTUR, PREGSERUM, HCG, HCGQUANT    Assessment & Plan:  Yesica Jacobs 52 y.o. female No obstetric history on file. Patient Active Problem List    Diagnosis Date Noted    SVT (supraventricular tachycardia) ([facility] [address] [facility] 08/14/2020    NSVT (nonsustained ventricular tachycardia) ([facility] 08/14/2020    Palpitations 04/03/2018    Gastroesophageal reflux disease 08/23/2017    Generalized anxiety disorder 08/23/2017       The patient was counseled on follow up and home care with restrictions noted.    Return in about 1 year (around 3/16/2022) for Annual.;     Won Cannon,   Ob/Gyn   [facility], [address]  3/16/2021, 10:29 AM

## 2021-03-18 LAB — SURGICAL PATHOLOGY REPORT: NORMAL

## 2021-04-15 ENCOUNTER — E-VISIT (OUTPATIENT)
Dept: FAMILY MEDICINE CLINIC | Age: 51
End: 2021-04-15
Payer: OTHER GOVERNMENT

## 2021-04-15 DIAGNOSIS — J01.40 ACUTE NON-RECURRENT PANSINUSITIS: Primary | ICD-10-CM

## 2021-04-15 PROCEDURE — 99421 OL DIG E/M SVC 5-10 MIN: CPT | Performed by: PEDIATRICS

## 2021-04-15 RX ORDER — CIPROFLOXACIN 500 MG/1
500 TABLET, FILM COATED ORAL 2 TIMES DAILY
Qty: 20 TABLET | Refills: 0 | Status: SHIPPED | OUTPATIENT
Start: 2021-04-15 | End: 2021-04-25

## 2021-04-15 ASSESSMENT — LIFESTYLE VARIABLES: SMOKING_STATUS: NO, I'VE NEVER SMOKED

## 2021-06-15 ENCOUNTER — PATIENT MESSAGE (OUTPATIENT)
Dept: FAMILY MEDICINE CLINIC | Age: 51
End: 2021-06-15

## 2021-06-15 DIAGNOSIS — F41.1 GENERALIZED ANXIETY DISORDER: ICD-10-CM

## 2021-06-15 RX ORDER — CITALOPRAM 10 MG/1
10 TABLET ORAL DAILY
Qty: 90 TABLET | Refills: 1 | Status: SHIPPED | OUTPATIENT
Start: 2021-06-15 | End: 2022-06-15

## 2021-06-15 NOTE — TELEPHONE ENCOUNTER
From: Nathan López  To: Teresa Woods MD  Sent: 6/15/2021 2:18 PM EDT  Subject: Prescription Question    I started taking the citolopram again because I felt like I did before. I am about to run out but I have no refills left. Can you refill it for me? I know it has been a while since I have taken them, but I feel like it is helping again. Thank you!
No

## 2021-08-09 ENCOUNTER — OFFICE VISIT (OUTPATIENT)
Dept: PRIMARY CARE CLINIC | Age: 51
End: 2021-08-09
Payer: COMMERCIAL

## 2021-08-09 ENCOUNTER — HOSPITAL ENCOUNTER (OUTPATIENT)
Age: 51
Setting detail: SPECIMEN
Discharge: HOME | End: 2021-08-09
Payer: COMMERCIAL

## 2021-08-09 VITALS
OXYGEN SATURATION: 98 % | HEART RATE: 67 BPM | SYSTOLIC BLOOD PRESSURE: 120 MMHG | BODY MASS INDEX: 21.47 KG/M2 | WEIGHT: 131 LBS | DIASTOLIC BLOOD PRESSURE: 82 MMHG | TEMPERATURE: 97.7 F

## 2021-08-09 DIAGNOSIS — R51.9 NONINTRACTABLE HEADACHE, UNSPECIFIED CHRONICITY PATTERN, UNSPECIFIED HEADACHE TYPE: ICD-10-CM

## 2021-08-09 DIAGNOSIS — J32.9 SINUSITIS, UNSPECIFIED CHRONICITY, UNSPECIFIED LOCATION: Primary | ICD-10-CM

## 2021-08-09 PROCEDURE — 99213 OFFICE O/P EST LOW 20 MIN: CPT | Performed by: PHYSICIAN ASSISTANT

## 2021-08-09 RX ORDER — PREDNISONE 20 MG/1
TABLET ORAL
COMMUNITY
Start: 2021-08-07 | End: 2021-08-25

## 2021-08-09 RX ORDER — DOXYCYCLINE HYCLATE 100 MG
100 TABLET ORAL 2 TIMES DAILY
Qty: 20 TABLET | Refills: 0 | Status: SHIPPED | OUTPATIENT
Start: 2021-08-09 | End: 2021-08-19

## 2021-08-09 ASSESSMENT — ENCOUNTER SYMPTOMS
SORE THROAT: 0
SHORTNESS OF BREATH: 0
GASTROINTESTINAL NEGATIVE: 1
SINUS PAIN: 1
SINUS PRESSURE: 1
SWOLLEN GLANDS: 0
COUGH: 0
CHEST TIGHTNESS: 0
HOARSE VOICE: 0

## 2021-08-09 NOTE — PROGRESS NOTES
[address]   [address] [facility]  Phone: [phone]  Fax: Elmer Ramos    Pt Name: Perla Terrell  MRN: [id number]  [place] 1970  Date of evaluation: 8/9/2021  Provider: Sylvester Laws PA-C     CHIEF COMPLAINT       Chief Complaint   Patient presents with    Headache     States that she went to [facility] on Sat. Had a CT- that didnt show anything. She is taking Prednisone and she states that it isnt helping. They also did COVID testing and it was Neg. Sx started Thursday afternoon. Was nauseated, but now she isnt. Only has sx of headache, sound bothers her. Neck pain. , and sinus pressure.  Sinus Problem           HISTORY OF PRESENT ILLNESS  (Location/Symptom, Timing/Onset, Context/Setting, Quality, Duration, Modifying Factors, Severity.)   Perla Terrell is a 54 y.o. White (non-) [1] female who presents to the office for evaluation of      Sinusitis  This is a new problem. The current episode started in the past 7 days. The problem has been gradually worsening since onset. There has been no fever. Associated symptoms include congestion, headaches, neck pain and sinus pressure. Pertinent negatives include no chills, coughing, diaphoresis, ear pain, hoarse voice, shortness of breath, sore throat or swollen glands. (Steroid and nasal congestions) Past treatments include spray decongestants and oral decongestants. Nursing Notes were reviewed. REVIEW OF SYSTEMS    (2-9 systems for level 4, 10 or more for level 5)     Review of Systems   Constitutional: Negative for chills and diaphoresis. HENT: Positive for congestion, postnasal drip, sinus pressure and sinus pain. Negative for ear pain, hoarse voice and sore throat. Respiratory: Negative for cough, chest tightness and shortness of breath. Gastrointestinal: Negative. Was nauseous but is no longer nauseous. Genitourinary: Negative.     Musculoskeletal: Positive for neck pain. Neurological: Positive for headaches. Except as noted above the remainder of the review of systems was reviewed andnegative. PAST MEDICAL HISTORY   History reviewed. Past Medical History:   Diagnosis Date    Allergic rhinitis          SURGICAL HISTORY     History reviewed. Past Surgical History:   Procedure Laterality Date     SECTION           CURRENT MEDICATIONS       Current Outpatient Medications   Medication Sig Dispense Refill    predniSONE (DELTASONE) 20 MG tablet take 2 tablets by mouth once daily for 5 days      doxycycline hyclate (VIBRA-TABS) 100 MG tablet Take 1 tablet by mouth 2 times daily for 10 days 20 tablet 0    citalopram (CELEXA) 10 MG tablet Take 1 tablet by mouth daily 90 tablet 1    metoprolol succinate (TOPROL XL) 25 MG extended release tablet Take 0.5 tablets by mouth daily 15 tablet 3     No current facility-administered medications for this visit. ALLERGIES     Erythromycin and Penicillins    FAMILY HISTORY           Problem Relation Age of Onset    No Known Problems Mother     No Known Problems Father      Family Status   Relation Name Status    Mother  Alive    Father  Alive          SOCIAL HISTORY      reports that she has never smoked. She has never used smokeless tobacco.      PHYSICAL EXAM    (up to 7 for level 4, 8 or more for level 5)     Vitals:    21 0905   BP: 120/82   Site: Left Upper Arm   Position: Supine   Cuff Size: Medium Adult   Pulse: 67   Temp: 97.7 °F (36.5 °C)   TempSrc: Tympanic   SpO2: 98%   Weight: 131 lb (59.4 kg)         Physical Exam  Vitals and nursing note reviewed. Constitutional:       General: She is not in acute distress. Appearance: Normal appearance. She is not ill-appearing. HENT:      Head: Normocephalic and atraumatic. Right Ear: External ear normal.      Left Ear: External ear normal.      Nose: Congestion present.       Mouth/Throat:      Mouth: Mucous membranes are moist.   Eyes:      Conjunctiva/sclera: Conjunctivae normal.      Pupils: Pupils are equal, round, and reactive to light. Cardiovascular:      Rate and Rhythm: Normal rate. Pulmonary:      Effort: Pulmonary effort is normal.   Abdominal:      Palpations: Abdomen is soft. Skin:     General: Skin is warm and dry. Neurological:      Mental Status: She is alert and oriented to person, place, and time. DIFFERENTIAL DIAGNOSIS:       Nima Fuller reviewed the disposition diagnosis with the patient and or their family/guardian. I have answered their questions and given discharge instructions. They voiced understanding of these instructions and did not have anyfurther questions or complaints. PROCEDURES:  Orders Placed This Encounter   Procedures    COVID-19     Standing Status:   Future     Standing Expiration Date:   8/9/2022     Scheduling Instructions:      1) Due to current limited availability of the COVID-19 test, tests will be prioritized based on responses to questions above. Testing may be delayed due to volume. 2) Print and instruct patient to adhere to CDC home isolation program. (Link Above)              3) Set up or refer patient for a monitoring program.              4) Have patient sign up for and leverage MyChart (if not previously done). Order Specific Question:   Is this test for diagnosis or screening? Answer:   Diagnosis of ill patient     Order Specific Question:   Symptomatic for COVID-19 as defined by CDC? Answer:   Yes     Order Specific Question:   Date of Symptom Onset     Answer:   8/5/2021     Order Specific Question:   Hospitalized for COVID-19? Answer:   No     Order Specific Question:   Admitted to ICU for COVID-19? Answer:   No     Order Specific Question:   Employed in healthcare setting? Answer:   No     Order Specific Question:   Resident in a congregate (group) care setting? Answer:   No     Order Specific Question:   Pregnant? Answer:   Unknown     Order Specific Question:   Previously tested for COVID-19? Answer:   Unknown       No results found for this visit on 08/09/21. FINALIMPRESSION      Visit Diagnoses and Associated Orders     Sinusitis, unspecified chronicity, unspecified location    -  Primary    COVID-19 [[id number] Custom]   - Future Order         Nonintractable headache, unspecified chronicity pattern, unspecified headache type        COVID-19 [[id number] Custom]   - Future Order         ORDERS WITHOUT AN ASSOCIATED DIAGNOSIS    predniSONE (DELTASONE) 20 MG tablet [6496]      doxycycline hyclate (VIBRA-TABS) 100 MG tablet [2625]              PLAN     Return if symptoms worsen or fail to improve. DISCHARGEMEDICATIONS:  Orders Placed This Encounter   Medications    doxycycline hyclate (VIBRA-TABS) 100 MG tablet     Sig: Take 1 tablet by mouth 2 times daily for 10 days     Dispense:  20 tablet     Refill:  0         Plan:  Specimen sent for a culture. Possible treatment alteration based on the results. Based on the duration and severity of the symptoms-- I will treat this as bacterial at this time. Patient instructed to complete antibiotic prescription fully. May use Motrin/Tylenol for fever/pain. Saline washes, salt water gargles and over the counter preparations if desired. Patient agreeable to treatment plan. Educational materials provided on AVS.  Follow up if symptoms do not improve/worsen. Patient instructed to return to the office if symptoms worsen, return, or have any other concerns. Patient understands and is agreeable.          Lauren Vazquez PA-C 8/9/2021 12:01 PM

## 2021-08-09 NOTE — PATIENT INSTRUCTIONS
Patient Education        Sinusitis: Care Instructions  Your Care Instructions     Sinusitis is an infection of the lining of the sinus cavities in your head. Sinusitis often follows a cold. It causes pain and pressure in your head and face. In most cases, sinusitis gets better on its own in 1 to 2 weeks. But some mild symptoms may last for several weeks. Sometimes antibiotics are needed. Follow-up care is a key part of your treatment and safety. Be sure to make and go to all appointments, and call your doctor if you are having problems. It's also a good idea to know your test results and keep a list of the medicines you take. How can you care for yourself at home? · Take an over-the-counter pain medicine, such as acetaminophen (Tylenol), ibuprofen (Advil, Motrin), or naproxen (Aleve). Read and follow all instructions on the label. · If the doctor prescribed antibiotics, take them as directed. Do not stop taking them just because you feel better. You need to take the full course of antibiotics. · Be careful when taking over-the-counter cold or flu medicines and Tylenol at the same time. Many of these medicines have acetaminophen, which is Tylenol. Read the labels to make sure that you are not taking more than the recommended dose. Too much acetaminophen (Tylenol) can be harmful. · Breathe warm, moist air from a steamy shower, a hot bath, or a sink filled with hot water. Avoid cold, dry air. Using a humidifier in your home may help. Follow the directions for cleaning the machine. · Use saline (saltwater) nasal washes. This can help keep your nasal passages open and wash out mucus and bacteria. You can buy saline nose drops at a grocery store or drugstore. Or you can make your own at home by adding 1 teaspoon of salt and 1 teaspoon of baking soda to 2 cups of distilled water. If you make your own, fill a bulb syringe with the solution, insert the tip into your nostril, and squeeze gently.  Ousmane Stiles your nose.  · Put a hot, wet towel or a warm gel pack on your face 3 or 4 times a day for 5 to 10 minutes each time. · Try a decongestant nasal spray like oxymetazoline (Afrin). Do not use it for more than 3 days in a row. Using it for more than 3 days can make your congestion worse. When should you call for help? Call your doctor now or seek immediate medical care if:    · You have new or worse swelling or redness in your face or around your eyes.     · You have a new or higher fever. Watch closely for changes in your health, and be sure to contact your doctor if:    · You have new or worse facial pain.     · The mucus from your nose becomes thicker (like pus) or has new blood in it.     · You are not getting better as expected. Where can you learn more? Go to https://[employer]Career.[employer]. org and sign in to your [employer]y account. Enter S251 in the [employer]io box to learn more about \"Sinusitis: Care Instructions. \"     If you do not have an account, please click on the \"Sign Up Now\" link. Current as of: December 2, 2020               Content Version: 12.9  © 5761-2805 [employer]. Care instructions adapted under license by [address] If you have questions about a medical condition or this instruction, always ask your healthcare professional. [place] [address] any warranty or liability for your use of this information. Patient Education        Saline Nasal Washes: Care Instructions  Your Care Instructions     Saline nasal washes help keep the nasal passages open by washing out thick or dried mucus. This simple remedy can help relieve symptoms of allergies, sinusitis, and colds. It also can make the nose feel more comfortable by keeping the mucous membranes moist. You may notice a little burning sensation in your nose the first few times you use the solution, but this usually gets better in a few days.   Follow-up care is a key part of your treatment and safety. Be sure to make and go to all appointments, and call your doctor if you are having problems. It's also a good idea to know your test results and keep a list of the medicines you take. How can you care for yourself at home? · You can buy premixed saline solution in a squeeze bottle or other sinus rinse products at a drugstore. Read and follow the instructions on the label. · You also can make your own saline solution by adding 1 teaspoon of salt and 1 teaspoon of baking soda to 2 cups of distilled water. · If you use a homemade solution, pour a small amount into a clean bowl. Using a rubber bulb syringe, squeeze the syringe and place the tip in the salt water. Pull a small amount of the salt water into the syringe by relaxing your hand. · Sit down with your head tilted slightly back. Do not lie down. Put the tip of the bulb syringe or the squeeze bottle a little way into one of your nostrils. Gently drip or squirt a few drops into the nostril. Repeat with the other nostril. Some sneezing and gagging are normal at first.  · Gently blow your nose. · Wipe the syringe or bottle tip clean after each use. · Repeat this 2 or 3 times a day. · Use nasal washes gently if you have nosebleeds often. When should you call for help? Watch closely for changes in your health, and be sure to contact your doctor if:    · You often get nosebleeds.     · You have problems doing the nasal washes. Where can you learn more? Go to https://[employer] Gradingtimmy.ActX. org and sign in to your [employer] account. Enter [phone] in the [facility] box to learn more about \"Saline Nasal Washes: Care Instructions. \"     If you do not have an account, please click on the \"Sign Up Now\" link. Current as of: December 2, 2020               Content Version: 12.9  © 1217-6174 [employer]. Care instructions adapted under license by [facility] ([facility]).  If you have questions about a medical condition or this instruction, always ask your healthcare professional. [place] [address] any warranty or liability for your use of this information.

## 2021-08-09 NOTE — LETTER
[address]   [address] [place] [address]  Phone: [phone]  Fax: [phone]    Reena Rider        August 9, 2021     Patient: Shalonda Benson   YOB: 1970   Date of Visit: 8/9/2021       To Whom it May Concern:    Yessica Ponce was seen in my clinic on 8/9/2021. She is to remain off work until her [place] comes back negative    If you have any questions or concerns, please don't hesitate to call.     Sincerely,         Robb Gray PA-C

## 2021-08-10 DIAGNOSIS — R51.9 NONINTRACTABLE HEADACHE, UNSPECIFIED CHRONICITY PATTERN, UNSPECIFIED HEADACHE TYPE: ICD-10-CM

## 2021-08-10 DIAGNOSIS — J32.9 SINUSITIS, UNSPECIFIED CHRONICITY, UNSPECIFIED LOCATION: ICD-10-CM

## 2021-08-11 LAB
SARS-COV-2: NORMAL
SARS-COV-2: NOT DETECTED
SOURCE: NORMAL

## 2021-08-12 ENCOUNTER — NURSE TRIAGE (OUTPATIENT)
Dept: OTHER | Facility: CLINIC | Age: 51
End: 2021-08-12

## 2021-08-12 NOTE — TELEPHONE ENCOUNTER
[facility] from Ren[facility] at [facility] [employer] [facility]. Brief description of triage: see below. Patient with headache since Thursday. She has been seen at the ER on Saturday. She tested negative for covid and was started on an antibiotic for a sinus infection. Her headache has not improved which is why she is calling today. Triage indicates for patient to be seen in the ED after second level triage with Thanh Crooks NP. Pt refused ED stated she had a CD with her CT results. Patient transferred to the office. Care advice provided, patient verbalizes understanding; denies any other questions or concerns; instructed to call back for any new or worsening symptoms. Attempted to transfer patient to office. First time I was placed on hold for 5 minutes before someone hung up. The second and third time it would ring and then give a busy signal. The fourth time I was finally able to reach Spruce. Writer provided warm transfer to Spruce at Dr. Marly Reilly office. Attention Provider: Thank you for allowing me to participate in the care of your patient. The patient was connected to triage in response to information provided to the [facility]. Please do not respond through this encounter as the response is not directed to a shared pool. Reason for Disposition   SEVERE headache, states 'worst headache' of life    Answer Assessment - Initial Assessment Questions  1. LOCATION: \"Where does it hurt? \"       The top of her head and back of her neck. 2. ONSET: \"When did the headache start? \" (Minutes, hours or days)       Thursday. 3. PATTERN: \"Does the pain come and go, or has it been constant since it started? \"      Constantly there. 4. SEVERITY: \"How bad is the pain? \" and \"What does it keep you from doing? \"  (e.g., Scale 1-10; mild, moderate, or severe)    - MILD (1-3): doesn't interfere with normal activities     - MODERATE (4-7): interferes with normal activities or awakens from sleep     - SEVERE (8-10): excruciating pain, unable to do any normal activities         8-9    5. RECURRENT SYMPTOM: \"Have you ever had headaches before? \" If so, ask: \"When was the last time? \" and \"What happened that time? \"       No     6. CAUSE: \"What do you think is causing the headache? \"  No idea         7. MIGRAINE: \"Have you been diagnosed with migraine headaches? \" If so, ask: \"Is this headache similar? \"       No     8. HEAD INJURY: \"Has there been any recent injury to the head? \"       No     9. OTHER SYMPTOMS: \"Do you have any other symptoms? \" (fever, stiff neck, eye pain, sore throat, cold symptoms)      Sinus pain and nausea. 10. PREGNANCY: \"Is there any chance you are pregnant? \" \"When was your last menstrual period? \"       No LMP a month ago.     Protocols used: HEADACHE-ADULT-OH

## 2021-08-13 ENCOUNTER — OFFICE VISIT (OUTPATIENT)
Dept: FAMILY MEDICINE CLINIC | Age: 51
End: 2021-08-13
Payer: COMMERCIAL

## 2021-08-13 VITALS
SYSTOLIC BLOOD PRESSURE: 124 MMHG | TEMPERATURE: 98 F | WEIGHT: 132 LBS | DIASTOLIC BLOOD PRESSURE: 78 MMHG | BODY MASS INDEX: 21.63 KG/M2 | HEART RATE: 88 BPM | OXYGEN SATURATION: 99 % | RESPIRATION RATE: 14 BRPM

## 2021-08-13 DIAGNOSIS — R11.0 NAUSEA: ICD-10-CM

## 2021-08-13 DIAGNOSIS — G44.209 TENSION HEADACHE: ICD-10-CM

## 2021-08-13 DIAGNOSIS — R44.8 FACIAL PRESSURE: Primary | ICD-10-CM

## 2021-08-13 DIAGNOSIS — G43.819 OTHER MIGRAINE WITHOUT STATUS MIGRAINOSUS, INTRACTABLE: ICD-10-CM

## 2021-08-13 DIAGNOSIS — M54.2 NECK PAIN: ICD-10-CM

## 2021-08-13 DIAGNOSIS — M62.838 MUSCLE SPASM: ICD-10-CM

## 2021-08-13 PROCEDURE — 99214 OFFICE O/P EST MOD 30 MIN: CPT | Performed by: PEDIATRICS

## 2021-08-13 PROCEDURE — 96372 THER/PROPH/DIAG INJ SC/IM: CPT | Performed by: PEDIATRICS

## 2021-08-13 RX ORDER — ONDANSETRON 4 MG/1
4 TABLET, FILM COATED ORAL EVERY 8 HOURS PRN
Qty: 30 TABLET | Refills: 0 | Status: SHIPPED | OUTPATIENT
Start: 2021-08-13 | End: 2022-06-19

## 2021-08-13 RX ORDER — KETOROLAC TROMETHAMINE 30 MG/ML
60 INJECTION, SOLUTION INTRAMUSCULAR; INTRAVENOUS ONCE
Status: COMPLETED | OUTPATIENT
Start: 2021-08-13 | End: 2021-08-13

## 2021-08-13 RX ORDER — PROMETHAZINE HYDROCHLORIDE 25 MG/ML
12.5 INJECTION, SOLUTION INTRAMUSCULAR; INTRAVENOUS ONCE
Status: COMPLETED | OUTPATIENT
Start: 2021-08-13 | End: 2021-08-13

## 2021-08-13 RX ORDER — CYCLOBENZAPRINE HCL 10 MG
10 TABLET ORAL 3 TIMES DAILY PRN
Qty: 30 TABLET | Refills: 0 | Status: SHIPPED | OUTPATIENT
Start: 2021-08-13 | End: 2021-08-23

## 2021-08-13 RX ORDER — UBROGEPANT 100 MG/1
100 TABLET ORAL
Qty: 2 TABLET | Refills: 0 | COMMUNITY
Start: 2021-08-13 | End: 2021-08-13

## 2021-08-13 RX ADMIN — KETOROLAC TROMETHAMINE 60 MG: 30 INJECTION, SOLUTION INTRAMUSCULAR; INTRAVENOUS at 17:08

## 2021-08-13 RX ADMIN — PROMETHAZINE HYDROCHLORIDE 12.5 MG: 25 INJECTION, SOLUTION INTRAMUSCULAR; INTRAVENOUS at 17:10

## 2021-08-13 SDOH — ECONOMIC STABILITY: FOOD INSECURITY: WITHIN THE PAST 12 MONTHS, YOU WORRIED THAT YOUR FOOD WOULD RUN OUT BEFORE YOU GOT MONEY TO BUY MORE.: NEVER TRUE

## 2021-08-13 SDOH — ECONOMIC STABILITY: FOOD INSECURITY: WITHIN THE PAST 12 MONTHS, THE FOOD YOU BOUGHT JUST DIDN'T LAST AND YOU DIDN'T HAVE MONEY TO GET MORE.: NEVER TRUE

## 2021-08-13 ASSESSMENT — ENCOUNTER SYMPTOMS
CONSTIPATION: 0
SINUS PRESSURE: 1
WHEEZING: 0
DIARRHEA: 0
SHORTNESS OF BREATH: 0
BACK PAIN: 1
STRIDOR: 0
RHINORRHEA: 0
SORE THROAT: 0
VOMITING: 0
NAUSEA: 1
COUGH: 0
ABDOMINAL PAIN: 0
COLOR CHANGE: 0

## 2021-08-13 ASSESSMENT — SOCIAL DETERMINANTS OF HEALTH (SDOH): HOW HARD IS IT FOR YOU TO PAY FOR THE VERY BASICS LIKE FOOD, HOUSING, MEDICAL CARE, AND HEATING?: NOT HARD AT ALL

## 2021-08-13 NOTE — PROGRESS NOTES
Stephanie You (:  1970) is a 54 y.o. female,Established patient, here for evaluation of the following chief complaint(s):  Migraine         ASSESSMENT/PLAN:    1. Facial pressure  2. Neck pain  3. Muscle spasm  -     cyclobenzaprine (FLEXERIL) 10 MG tablet; Take 1 tablet by mouth 3 times daily as needed for Muscle spasms, Disp-30 tablet, R-0Normal  4. Tension headache  5. Other migraine without status migrainosus, intractable  -     ketorolac (TORADOL) injection 60 mg; 60 mg, Intramuscular, ONCE, On 21 at 1700, For 1 doseDo not administer for more than 5 days. -     promethazine (PHENERGAN) injection 12.5 mg; 12.5 mg, Intramuscular, ONCE, On 21 at 1700, For 1 doseRecommended route is IM. For IV administration, dilute to 10ml with normal saline. Must be administered over at least 10 minutes. -     Ubrogepant (UBRELVY) 100 MG TABS; Take 100 mg by mouth once as needed (headache), Disp-2 tablet, R-0Sample  6. Nausea  -     ondansetron (ZOFRAN) 4 MG tablet; Take 1 tablet by mouth every 8 hours as needed for Nausea or Vomiting, Disp-30 tablet, R-0Normal  -     promethazine (PHENERGAN) injection 12.5 mg; 12.5 mg, Intramuscular, ONCE, On 21 at 1700, For 1 doseRecommended route is IM. For IV administration, dilute to 10ml with normal saline. Must be administered over at least 10 minutes. Proceed with Toradol 60 mg IM injection x1 now  Proceed with Phenergan 12.5 mg IM injection x1 now  Flexeril 10 mg 3 times daily to start tonight  Zofran 4 mg 3 times daily as needed  Ubrelvy samples given to patient if symptoms are not improved by tomorrow -take 100 mg once and then repeat in 2 hours if no improvement  Proceed to emergency room if symptoms worsen significantly over the weekend  Consider MRI of the brain and cervical spine if symptoms persist  Consider urgent neurology referral if symptoms persist  Call with concerns        Return if symptoms worsen or fail to improve. Subjective     HPI    Patient presents today for evaluation of a constellation of symptoms that have been present since last week. She states that on last Thursday she started with some sinus / facial pressure and ear pressure. She did not have any congestion or runny nose. She did not have any fever or cough. 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 This was not her usual job and so this was something unusual for her. She not sure if this has anything to do with that or not. On Friday she started to feel nauseous all day especially when she was in an upright position. She did feel little bit better when she was lying down. She had some mild dizziness associated with this. She started taking some ibuprofen but it was not too helpful. Saturday she continued to feel bad so she ended up going to [facility] for evaluation. She states that they did put an IV and gave her IV fluids and gave her something for nausea. I did do a CT scan which was negative. They did a COVID test that was negative. The provider told her that she did have some fluid behind her right ear and gave her steroid for 5 days. She took this but this did nothing for her symptoms. Monday she came into the walk-in clinic and was given an antibiotic for suspected sinusitis. This has not changed her symptoms at all. She then decided to go to her chiropractor on Monday, Wednesday and Thursday. He did some adjustments and acupuncture and she feels like this made her symptoms worse. She has noticed that the right side of her neck hurts worse than the left side. This does radiate down the center of her back that feels somewhat tingly. She states that her head feels somewhat heavy and it hurts to hold it up.   She was having family and over the weekend and so she was somewhat stressed and she thinks this may have made things a little worse. She did try some Advil, massage and CBD cream which was not helpful. She denies any light sensitivity or sound sensitivity. There is no new exposure to anything that she can think of.  cmw        Review of Systems   Constitutional: Positive for fatigue. Negative for appetite change and fever. HENT: Positive for ear pain and sinus pressure. Negative for congestion, rhinorrhea and sore throat. Respiratory: Negative for cough, shortness of breath, wheezing and stridor. Cardiovascular: Negative for chest pain, palpitations and leg swelling. Gastrointestinal: Positive for nausea. Negative for abdominal pain, constipation, diarrhea and vomiting. Endocrine: Negative for polydipsia, polyphagia and polyuria. Genitourinary: Negative for dysuria, hematuria and urgency. Musculoskeletal: Positive for back pain and neck pain. Negative for neck stiffness. Skin: Negative for color change and rash. Allergic/Immunologic: Negative for immunocompromised state. Neurological: Positive for dizziness and headaches. Negative for tremors, seizures, syncope, speech difficulty, weakness, light-headedness and numbness. Hematological: Negative for adenopathy. Does not bruise/bleed easily. Psychiatric/Behavioral: Negative for sleep disturbance. Objective      Physical Exam  Vitals and nursing note reviewed. Constitutional:       General: She is not in acute distress. Appearance: Normal appearance. She is not ill-appearing, toxic-appearing or diaphoretic. Comments: uncomfortable   HENT:      Head: Normocephalic. Jaw: There is normal jaw occlusion. Right Ear: Tympanic membrane, ear canal and external ear normal. There is no impacted cerumen. Left Ear: Tympanic membrane, ear canal and external ear normal. There is no impacted cerumen. Nose: No nasal deformity, mucosal edema, congestion or rhinorrhea. Right Sinus: Maxillary sinus tenderness present. Left Sinus: Maxillary sinus tenderness present. Mouth/Throat:      Lips: Pink. Mouth: Mucous membranes are moist.      Tongue: Tongue does not deviate from midline. Pharynx: No oropharyngeal exudate. Eyes:      General: No scleral icterus. Right eye: No discharge. Left eye: No discharge. Extraocular Movements: Extraocular movements intact. Conjunctiva/sclera: Conjunctivae normal.      Pupils: Pupils are equal, round, and reactive to light. Neck:      Thyroid: No thyroid mass, thyromegaly or thyroid tenderness. Comments: She does get right sided neck pain with right rotation of the neck. Cardiovascular:      Rate and Rhythm: Normal rate and regular rhythm. Pulses: Normal pulses. Heart sounds: Normal heart sounds. No murmur heard. Pulmonary:      Effort: Pulmonary effort is normal. No respiratory distress. Breath sounds: Normal breath sounds. No stridor. No wheezing, rhonchi or rales. Chest:      Chest wall: No tenderness. Musculoskeletal:      Cervical back: Normal range of motion. Spasms and tenderness present. No edema, erythema, rigidity or crepitus. Pain with movement (right) and muscular tenderness present. No spinous process tenderness. Normal range of motion. Thoracic back: Normal.      Lumbar back: Normal.   Lymphadenopathy:      Cervical: No cervical adenopathy. Right cervical: No superficial, deep or posterior cervical adenopathy. Left cervical: No superficial, deep or posterior cervical adenopathy. Neurological:      Mental Status: She is alert. Cranial Nerves: Cranial nerves are intact. Sensory: Sensation is intact. Motor: Motor function is intact. Coordination: Coordination is intact. Gait: Gait is intact. An electronic signature was used to authenticate this note.     --Godwin Schwartz MD

## 2021-08-25 ENCOUNTER — OFFICE VISIT (OUTPATIENT)
Dept: FAMILY MEDICINE CLINIC | Age: 51
End: 2021-08-25
Payer: COMMERCIAL

## 2021-08-25 VITALS — TEMPERATURE: 97.3 F | DIASTOLIC BLOOD PRESSURE: 78 MMHG | SYSTOLIC BLOOD PRESSURE: 120 MMHG | HEART RATE: 88 BPM

## 2021-08-25 DIAGNOSIS — R20.2 NUMBNESS AND TINGLING OF LEFT LOWER EXTREMITY: ICD-10-CM

## 2021-08-25 DIAGNOSIS — M54.50 LOW BACK PAIN RADIATING TO LEFT LOWER EXTREMITY: ICD-10-CM

## 2021-08-25 DIAGNOSIS — M54.2 NECK PAIN: ICD-10-CM

## 2021-08-25 DIAGNOSIS — M54.16 LUMBAR RADICULAR PAIN: ICD-10-CM

## 2021-08-25 DIAGNOSIS — R20.0 NUMBNESS AND TINGLING OF LEFT LOWER EXTREMITY: ICD-10-CM

## 2021-08-25 DIAGNOSIS — G44.209 TENSION HEADACHE: ICD-10-CM

## 2021-08-25 DIAGNOSIS — R51.9 CHRONIC INTRACTABLE HEADACHE, UNSPECIFIED HEADACHE TYPE: Primary | ICD-10-CM

## 2021-08-25 DIAGNOSIS — H53.9 VISION CHANGES: ICD-10-CM

## 2021-08-25 DIAGNOSIS — M54.12 CERVICAL RADICULOPATHY: ICD-10-CM

## 2021-08-25 DIAGNOSIS — G89.29 CHRONIC INTRACTABLE HEADACHE, UNSPECIFIED HEADACHE TYPE: Primary | ICD-10-CM

## 2021-08-25 DIAGNOSIS — M79.605 LOW BACK PAIN RADIATING TO LEFT LOWER EXTREMITY: ICD-10-CM

## 2021-08-25 DIAGNOSIS — R29.2 HYPERREFLEXIA OF LOWER EXTREMITY: ICD-10-CM

## 2021-08-25 DIAGNOSIS — R42 DIZZINESS: ICD-10-CM

## 2021-08-25 PROCEDURE — 99214 OFFICE O/P EST MOD 30 MIN: CPT | Performed by: PEDIATRICS

## 2021-08-25 ASSESSMENT — ENCOUNTER SYMPTOMS
RHINORRHEA: 0
EYE ITCHING: 0
BACK PAIN: 1
COLOR CHANGE: 0
NAUSEA: 1
EYE DISCHARGE: 0
COUGH: 0
SORE THROAT: 0
SINUS PRESSURE: 0
CONSTIPATION: 0
ABDOMINAL PAIN: 0
STRIDOR: 0
WHEEZING: 0
DIARRHEA: 0
SHORTNESS OF BREATH: 0
EYE PAIN: 0
VOMITING: 0

## 2021-08-25 NOTE — PROGRESS NOTES
Magdalene Montalvo (:  1970) is a 54 y.o. female,Established patient, here for evaluation of the following chief complaint(s):  Headache         ASSESSMENT/PLAN:    1. Chronic intractable headache, unspecified headache type  -     MRI BRAIN W WO CONTRAST; Future  -     MRI CERVICAL SPINE W WO CONTRAST; Future  2. Tension headache  -     MRI BRAIN W WO CONTRAST; Future  -     MRI CERVICAL SPINE W WO CONTRAST; Future  3. Dizziness  -     MRI BRAIN W WO CONTRAST; Future  4. Neck pain  -     MRI BRAIN W WO CONTRAST; Future  -     MRI CERVICAL SPINE W WO CONTRAST; Future  5. Cervical radiculopathy  -     MRI BRAIN W WO CONTRAST; Future  -     MRI CERVICAL SPINE W WO CONTRAST; Future  6. Vision changes  -     MRI BRAIN W WO CONTRAST; Future  -     MRI CERVICAL SPINE W WO CONTRAST; Future  7. Low back pain radiating to left lower extremity  -     MRI CERVICAL SPINE W WO CONTRAST; Future  -     MRI LUMBAR SPINE W WO CONTRAST; Future  8. Lumbar radicular pain  -     MRI CERVICAL SPINE W WO CONTRAST; Future  -     MRI LUMBAR SPINE W WO CONTRAST; Future  9. Hyperreflexia of lower extremity  -     MRI BRAIN W WO CONTRAST; Future  -     MRI CERVICAL SPINE W WO CONTRAST; Future  -     MRI LUMBAR SPINE W WO CONTRAST; Future  10. Numbness and tingling of left lower extremity  -     MRI CERVICAL SPINE W WO CONTRAST; Future  -     MRI LUMBAR SPINE W WO CONTRAST; Future      Proceed with obtain MRI of the brain, cervical spine and lumbar spine with and without contrast to rule out brain lesion, multiple sclerosis or other demyelinating lesions  Consider urgent neurology consult if symptoms do not improve  Trial of half tablet of Flexeril during the day and full tablet at night  Stretching exercises recommended  No strenuous activity at work  Call with concerns or worsening symptoms      Return in about 4 weeks (around 2021), or if symptoms worsen or fail to improve.          Subjective     HPI    Patient presents today for routine follow-up of a constellation of symptoms for which she was seen approximately 10 days ago. She was seen because of some initial headache / sinus/facial pressure and ear pressure. 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 This is not her usual job and something that was unusual for her. She was not sure if this had anything to do with her symptoms. She then became nauseous especially when she was upright. She had associated dizziness/lightheadedness. She had a CT scan done at a local emergency room that night which was negative. They also did a Covid test that was negative. They told her that she had some fluid behind her right ear and gave her steroids which did nothing. She was then given an antibiotic for suspected sinus infection and did this did nothing either. She has seen the chiropractor multiple times. She then developed right-sided neck pain radiating into the right side of the neck. This then started to radiate down the middle of her back and she had somewhat numbness/tingling sensations down her back. After I saw her we tried a Toradol injection with Phenergan in addition to Flexeril and Zofran. She was also given some Ubrelvy samples to try. She stated that she did try all of these things and really had no significant relief from her symptoms. She still has persistence of all the above symptoms and now has left sided lower back pain that radiates into the left leg and down to the middle of the left foot. She also notices numbness and tingling on the bottom of the left foot radiating into the heel and left calf area. She denies any weakness of the lower extremities. She states that she is not able to sleep on the right side because of right-sided neck pain. She continues to have daily headaches which are tolerable.   She describes these as starting at the base of the neck and radiating upwards. This no longer radiates into the front of her head and she denies any further facial pressure. She still has ear pressure. Her headache is worse with bowel movements and intercourse. She denies any bowel or bladder dysfunction. She does report having some vision changes that have been present for the last year and have not changed with this acute headache. She does report that she has been taking generic Excedrin 3 times daily in addition to ibuprofen and this has been mildly helpful. She has not had any nausea or vomiting. She has a normal appetite. Her weight has been stable. She does tell me that she smashed her right index finger at work yesterday. She wonders what she can do for this. I did advise her that this would be a Workmen's Comp. claim and she would need to follow-up with her work regarding this. I did look at and it does not appear to be anything acute. There is no neurovascular compromise. cmw      Review of Systems   Constitutional: Positive for fatigue. Negative for appetite change and fever. HENT: Positive for ear pain. Negative for congestion, rhinorrhea, sinus pressure and sore throat. Eyes: Positive for visual disturbance. Negative for pain, discharge and itching. Respiratory: Negative for cough, shortness of breath, wheezing and stridor. Cardiovascular: Negative for chest pain, palpitations and leg swelling. Gastrointestinal: Positive for nausea. Negative for abdominal pain, constipation, diarrhea and vomiting. Endocrine: Negative for polydipsia, polyphagia and polyuria. Genitourinary: Negative for decreased urine volume, dysuria, frequency, hematuria and urgency. Musculoskeletal: Positive for back pain and neck pain. Negative for neck stiffness. Skin: Negative for color change and rash. Allergic/Immunologic: Negative for immunocompromised state. Neurological: Positive for dizziness, numbness and headaches. Negative for tremors, seizures, syncope, speech difficulty, weakness and light-headedness. Hematological: Negative for adenopathy. Does not bruise/bleed easily. Psychiatric/Behavioral: Negative for dysphoric mood and sleep disturbance. Objective      Physical Exam  Vitals and nursing note reviewed. Constitutional:       General: She is not in acute distress. Appearance: Normal appearance. She is not ill-appearing, toxic-appearing or diaphoretic. HENT:      Head: Normocephalic. Jaw: There is normal jaw occlusion. Right Ear: Tympanic membrane, ear canal and external ear normal. There is no impacted cerumen. Left Ear: Tympanic membrane, ear canal and external ear normal. There is no impacted cerumen. Nose: No nasal deformity, mucosal edema, congestion or rhinorrhea. Right Sinus: No maxillary sinus tenderness. Left Sinus: No maxillary sinus tenderness. Mouth/Throat:      Lips: Pink. Mouth: Mucous membranes are moist.      Tongue: Tongue does not deviate from midline. Pharynx: No oropharyngeal exudate. Eyes:      General: No scleral icterus. Right eye: No discharge. Left eye: No discharge. Extraocular Movements: Extraocular movements intact. Conjunctiva/sclera: Conjunctivae normal.      Pupils: Pupils are equal, round, and reactive to light. Neck:      Thyroid: No thyroid mass, thyromegaly or thyroid tenderness. Comments: She does get right sided neck pain with right rotation of the neck. Cardiovascular:      Rate and Rhythm: Normal rate and regular rhythm. Pulses: Normal pulses. Heart sounds: Normal heart sounds. No murmur heard. Pulmonary:      Effort: Pulmonary effort is normal. No respiratory distress. Breath sounds: Normal breath sounds. No stridor. No wheezing, rhonchi or rales. Chest:      Chest wall: No tenderness. Musculoskeletal:      Cervical back: Normal range of motion.  Spasms and

## 2021-09-07 ENCOUNTER — HOSPITAL ENCOUNTER (OUTPATIENT)
Dept: MRI IMAGING | Facility: CLINIC | Age: 51
Discharge: HOME | End: 2021-09-09
Payer: COMMERCIAL

## 2021-09-07 DIAGNOSIS — G44.209 TENSION HEADACHE: ICD-10-CM

## 2021-09-07 DIAGNOSIS — R20.2 NUMBNESS AND TINGLING OF LEFT LOWER EXTREMITY: ICD-10-CM

## 2021-09-07 DIAGNOSIS — M54.12 CERVICAL RADICULOPATHY: ICD-10-CM

## 2021-09-07 DIAGNOSIS — H53.9 VISION CHANGES: ICD-10-CM

## 2021-09-07 DIAGNOSIS — R29.2 HYPERREFLEXIA OF LOWER EXTREMITY: ICD-10-CM

## 2021-09-07 DIAGNOSIS — R42 DIZZINESS: ICD-10-CM

## 2021-09-07 DIAGNOSIS — R20.0 NUMBNESS AND TINGLING OF LEFT LOWER EXTREMITY: ICD-10-CM

## 2021-09-07 DIAGNOSIS — M54.16 LUMBAR RADICULAR PAIN: ICD-10-CM

## 2021-09-07 DIAGNOSIS — M54.2 NECK PAIN: ICD-10-CM

## 2021-09-07 DIAGNOSIS — G89.29 CHRONIC INTRACTABLE HEADACHE, UNSPECIFIED HEADACHE TYPE: ICD-10-CM

## 2021-09-07 DIAGNOSIS — M79.605 LOW BACK PAIN RADIATING TO LEFT LOWER EXTREMITY: ICD-10-CM

## 2021-09-07 DIAGNOSIS — R51.9 CHRONIC INTRACTABLE HEADACHE, UNSPECIFIED HEADACHE TYPE: ICD-10-CM

## 2021-09-07 DIAGNOSIS — M54.50 LOW BACK PAIN RADIATING TO LEFT LOWER EXTREMITY: ICD-10-CM

## 2021-09-07 PROCEDURE — 72156 MRI NECK SPINE W/O & W/DYE: CPT

## 2021-09-07 PROCEDURE — A9579 GAD-BASE MR CONTRAST NOS,1ML: HCPCS | Performed by: PEDIATRICS

## 2021-09-07 PROCEDURE — 70553 MRI BRAIN STEM W/O & W/DYE: CPT

## 2021-09-07 PROCEDURE — 72158 MRI LUMBAR SPINE W/O & W/DYE: CPT

## 2021-09-07 PROCEDURE — 6360000004 HC RX CONTRAST MEDICATION: Performed by: PEDIATRICS

## 2021-09-07 RX ADMIN — GADOTERIDOL 11 ML: 279.3 INJECTION, SOLUTION INTRAVENOUS at 14:30

## 2021-09-08 ENCOUNTER — TELEPHONE (OUTPATIENT)
Dept: FAMILY MEDICINE CLINIC | Age: 51
End: 2021-09-08

## 2021-09-08 DIAGNOSIS — R29.2 HYPERREFLEXIA OF LOWER EXTREMITY: ICD-10-CM

## 2021-09-08 DIAGNOSIS — R51.9 CHRONIC INTRACTABLE HEADACHE, UNSPECIFIED HEADACHE TYPE: ICD-10-CM

## 2021-09-08 DIAGNOSIS — R20.0 NUMBNESS AND TINGLING OF LEFT LOWER EXTREMITY: ICD-10-CM

## 2021-09-08 DIAGNOSIS — G89.29 CHRONIC INTRACTABLE HEADACHE, UNSPECIFIED HEADACHE TYPE: ICD-10-CM

## 2021-09-08 DIAGNOSIS — M54.50 LOW BACK PAIN RADIATING TO LEFT LOWER EXTREMITY: ICD-10-CM

## 2021-09-08 DIAGNOSIS — M54.12 CERVICAL RADICULOPATHY: Primary | ICD-10-CM

## 2021-09-08 DIAGNOSIS — R20.2 NUMBNESS AND TINGLING OF LEFT LOWER EXTREMITY: ICD-10-CM

## 2021-09-08 DIAGNOSIS — G44.209 TENSION HEADACHE: ICD-10-CM

## 2021-09-08 DIAGNOSIS — M79.605 LOW BACK PAIN RADIATING TO LEFT LOWER EXTREMITY: ICD-10-CM

## 2021-09-08 DIAGNOSIS — H53.9 VISION CHANGES: ICD-10-CM

## 2021-09-08 DIAGNOSIS — R42 DIZZINESS: ICD-10-CM

## 2021-09-08 DIAGNOSIS — M54.16 LUMBAR RADICULAR PAIN: ICD-10-CM

## 2021-09-08 DIAGNOSIS — M48.00 MULTILEVEL FORAMINAL STENOSIS: ICD-10-CM

## 2021-09-08 DIAGNOSIS — M54.2 NECK PAIN: ICD-10-CM

## 2021-09-08 DIAGNOSIS — M48.02 CERVICAL SPINAL STENOSIS: ICD-10-CM

## 2021-10-07 ENCOUNTER — NURSE ONLY (OUTPATIENT)
Dept: FAMILY MEDICINE CLINIC | Age: 51
End: 2021-10-07
Payer: COMMERCIAL

## 2021-10-07 VITALS
HEART RATE: 75 BPM | RESPIRATION RATE: 12 BRPM | HEIGHT: 66 IN | WEIGHT: 132 LBS | OXYGEN SATURATION: 99 % | TEMPERATURE: 98.2 F | BODY MASS INDEX: 21.21 KG/M2

## 2021-10-07 DIAGNOSIS — Z23 NEED FOR IMMUNIZATION AGAINST INFLUENZA: Primary | ICD-10-CM

## 2021-10-07 PROCEDURE — 90471 IMMUNIZATION ADMIN: CPT | Performed by: PEDIATRICS

## 2021-10-07 PROCEDURE — 90674 CCIIV4 VAC NO PRSV 0.5 ML IM: CPT | Performed by: PEDIATRICS

## 2021-10-18 ENCOUNTER — OFFICE VISIT (OUTPATIENT)
Dept: NEUROLOGY | Age: 51
End: 2021-10-18
Payer: COMMERCIAL

## 2021-10-18 VITALS
HEIGHT: 66 IN | SYSTOLIC BLOOD PRESSURE: 117 MMHG | BODY MASS INDEX: 22.14 KG/M2 | WEIGHT: 137.8 LBS | HEART RATE: 68 BPM | DIASTOLIC BLOOD PRESSURE: 78 MMHG

## 2021-10-18 DIAGNOSIS — R20.2 NUMBNESS AND TINGLING OF FOOT: Primary | ICD-10-CM

## 2021-10-18 DIAGNOSIS — R20.0 NUMBNESS AND TINGLING OF FOOT: Primary | ICD-10-CM

## 2021-10-18 DIAGNOSIS — R51.9 HEADACHE DISORDER: ICD-10-CM

## 2021-10-18 DIAGNOSIS — M48.02 CERVICAL STENOSIS OF SPINE: ICD-10-CM

## 2021-10-18 PROCEDURE — 99204 OFFICE O/P NEW MOD 45 MIN: CPT | Performed by: STUDENT IN AN ORGANIZED HEALTH CARE EDUCATION/TRAINING PROGRAM

## 2021-10-18 RX ORDER — METHYLPREDNISOLONE 4 MG/1
TABLET ORAL
Qty: 1 KIT | Refills: 0 | Status: SHIPPED | OUTPATIENT
Start: 2021-10-18 | End: 2021-10-24

## 2021-10-18 ASSESSMENT — ENCOUNTER SYMPTOMS
EYES NEGATIVE: 1
RESPIRATORY NEGATIVE: 1
GASTROINTESTINAL NEGATIVE: 1
BACK PAIN: 1

## 2021-10-18 NOTE — ASSESSMENT & PLAN NOTE
-Discussed PT but patient would like to defer at this time due to possible cose  -Denies neck pain or radicular symptoms  -Will continue to monitor

## 2021-10-18 NOTE — ASSESSMENT & PLAN NOTE
Discussed limiting analgesic use to 2-3 times a week   Headache diary   Will assess at next appointment if we need to start prophylactic medcation   Medrol dose pack for abortive therapy

## 2021-10-18 NOTE — PROGRESS NOTES
[address] [place] [id number]  Dept: [phone]    PATIENT NAME: Gladis Live  PATIENT MRN: [id number]  PRIMARY CARE PHYSICIAN: Adrian Hitchcock MD    HPI:      Gladis Live is a 54 y.o. female who presents to clinic today for evaluation of Back Pain and Neck Pain     Beginning of august, she had 4 days of a pressure headache and neck pain. It worsens with coughing, sneezing, and bending. Headache is described as a pressure in her entire head rated a 3/10 in severity. She had a headache daily and notes that Excedrin in the morning which does relieve the pain. She has been taking a medication 1-2 times a day. She is most concerned about her back pain that feels like she has a \"pinched nerve \". Occasionally she has numbness in her left 3 toes. She notes that this worsens if she sits too long. Denies any significant neck pain, cervical spine and lumbar spine. Patient was under the impression that her lumbar region had the stenosis and was causing her back pain. Discussed that her lumbar spine was normal and the narrowing was actually seen in the cervical spine. MRI brain:2021  Unremarkable MRI of the brain.       No acute infarct.       No abnormal enhancement on postcontrast imaging       MRI C-spine:  1. Mild spinal canal stenosis at the C3-4, C4-5, and C5-6 levels. 2. Mild right C4 and left C6 neural foraminal narrowing.         MRI L spine:  Unremarkable MRI lumbar spine    PREVIOUS WORKUP:     Lab Results   Component Value Date    WBC 8.3 2020    HGB 13.6 2020    HCT 39.7 2020    MCV 91.9 2020     2020       Past Medical History:   Diagnosis Date    Allergic rhinitis         Past Surgical History:   Procedure Laterality Date     SECTION          Social History     Socioeconomic History    Marital status: Single     Spouse name: Not on file    Number of children: Not on file  Years of education: Not on file    Highest education level: Not on file   Occupational History    Not on file   Tobacco Use    Smoking status: Never Smoker    Smokeless tobacco: Never Used   Substance and Sexual Activity    Alcohol use: Yes     Comment: occasionally    Drug use: Never    Sexual activity: Not on file   Other Topics Concern    Not on file   Social History Narrative    Not on file     Social Determinants of Health     Financial Resource Strain: Low Risk     Difficulty of Paying Living Expenses: Not hard at all   Food Insecurity: No Food Insecurity    Worried About Running Out of Food in the Last Year: Never true    [address] in the Last Year: Never true   Transportation Needs:     Lack of Transportation (Medical):  Lack of Transportation (Non-Medical):    Physical Activity:     Days of Exercise per Week:     Minutes of Exercise per Session:    Stress:     Feeling of Stress :    Social Connections:     Frequency of Communication with Friends and Family:     Frequency of Social Gatherings with Friends and Family:     Attends [employer] Services:     Active Member of Clubs or Organizations:     Attends Club or Organization Meetings:     Marital Status:    Intimate Partner Violence:     Fear of Current or Ex-Partner:     Emotionally Abused:     Physically Abused:     Sexually Abused:         Current Outpatient Medications   Medication Sig Dispense Refill    methylPREDNISolone (MEDROL DOSEPACK) 4 MG tablet Take by mouth. 1 kit 0    ondansetron (ZOFRAN) 4 MG tablet Take 1 tablet by mouth every 8 hours as needed for Nausea or Vomiting 30 tablet 0    citalopram (CELEXA) 10 MG tablet Take 1 tablet by mouth daily 90 tablet 1    metoprolol succinate (TOPROL XL) 25 MG extended release tablet Take 0.5 tablets by mouth daily 15 tablet 3     No current facility-administered medications for this visit.         Allergies   Allergen Reactions    Erythromycin Rash    XI - symmetrical shoulder shrug                                                       XII - tongue midline without atrophy or fasciculation      Motor function  Normal muscle bulk and tone; strength 5/5 on all 4 extremities, no pronator drift      Sensory function Intact to light touch, pinprick, vibration, proprioception on all 4 extremities      Cerebellar Intact fine motor movement. No involuntary movements or tremors. No ataxia or dysmetria on finger to nose or heel to shin testing      Reflex function DTR 2+ on bilateral UE and LE, symmetric. Negative Babinski      Gait                   normal base and arm swing        ASSESSMENT / PLAN:   This is a 30-year-old patient who presents for evaluation of neck and back pain. Patient had an MRI of her cervical spine which showed mild spinal stenosis at C3-C4, C4-C5, and C5-C6 levels. There is mild right C4 and left C6 neural foraminal narrowing. Patient's lumbar spine was unremarkable. An MRI brain did not show any acute infarct or demyelinating lesions. Discussed with patient that she would benefit from physical therapy for her neck and back pain but. Patient is worried about the cost of therapy. Discussed with patient that if symptoms worsen she should be reassessed. Neurological exam is normal today. Discussed with patient that her sensory exam is normal but if sensory symptoms worsen we can definitely reevaluate with an EMG or further work-up. Patient would like to defer further management due to cost.  Thus we will proceed with basic lab work-up for an underlying neuropathy and reassess in 6 months. 1. Numbness and tingling of foot  -     Vitamin B12 & Folate; Future  -     TSH with Reflex; Future  -     LANA profile; Future  2.  Headache disorder  Assessment & Plan:   Discussed limiting analgesic use to 2-3 times a week   Headache diary   Will assess at next appointment if we need to start prophylactic medcation   Medrol dose pack for abortive therapy  3. Cervical stenosis of spine  Assessment & Plan:  -Discussed PT but patient would like to defer at this time due to possible cose  -Denies neck pain or radicular symptoms  -Will continue to monitor       Ms. Desir received counseling on the following healthy behaviors: medical compliance, smoking cessation, blood pressure control, regular follow up with primary doctor.         Electronically signed by Nasreen Robertson MD on 10/18/2021 at 4:38 PM

## 2022-01-17 ENCOUNTER — PATIENT MESSAGE (OUTPATIENT)
Dept: FAMILY MEDICINE CLINIC | Age: 52
End: 2022-01-17

## 2022-01-20 NOTE — TELEPHONE ENCOUNTER
From: Chance Parra  To: Dr. Vicki Lama: 2022 6:23 PM EST  Subject: FMLA/Neurologist followup    My FMLA paperwork will  next month and I was wondering if I could see you to do a follow up. I don't have the head aches like I did but still do have some pressure issues when coughing/sneezing. I was really disappointed with my appt with neurologist and will not follow up with them. I still have the numbness in toes on left foot and seem to be tired a lot. Feel like I want to sleep more then I did before. Would like to try and schedule a followup/checkup and possibly do some bloodwork just to check things out. I would like to get another FMLA paper too just to cover me just in case. Thanks!  Serjio Solis

## 2022-02-04 ENCOUNTER — VIRTUAL VISIT (OUTPATIENT)
Dept: FAMILY MEDICINE CLINIC | Age: 52
End: 2022-02-04
Payer: COMMERCIAL

## 2022-02-04 DIAGNOSIS — R25.2 CRAMP OF TOE: ICD-10-CM

## 2022-02-04 DIAGNOSIS — M79.605 LOW BACK PAIN RADIATING TO LEFT LOWER EXTREMITY: ICD-10-CM

## 2022-02-04 DIAGNOSIS — R53.83 OTHER FATIGUE: ICD-10-CM

## 2022-02-04 DIAGNOSIS — M54.12 CERVICAL RADICULOPATHY: ICD-10-CM

## 2022-02-04 DIAGNOSIS — M48.02 CERVICAL SPINAL STENOSIS: ICD-10-CM

## 2022-02-04 DIAGNOSIS — H93.8X1 SENSATION OF PLUGGED EAR ON RIGHT SIDE: ICD-10-CM

## 2022-02-04 DIAGNOSIS — N95.1 PERIMENOPAUSE: ICD-10-CM

## 2022-02-04 DIAGNOSIS — N92.6 IRREGULAR MENSTRUAL CYCLE: ICD-10-CM

## 2022-02-04 DIAGNOSIS — M54.50 LOW BACK PAIN RADIATING TO LEFT LOWER EXTREMITY: ICD-10-CM

## 2022-02-04 DIAGNOSIS — R20.0 NUMBNESS OF TOES: ICD-10-CM

## 2022-02-04 DIAGNOSIS — R51.9 NONINTRACTABLE EPISODIC HEADACHE, UNSPECIFIED HEADACHE TYPE: Primary | ICD-10-CM

## 2022-02-04 PROCEDURE — 99214 OFFICE O/P EST MOD 30 MIN: CPT | Performed by: PEDIATRICS

## 2022-02-04 ASSESSMENT — ENCOUNTER SYMPTOMS
EYE DISCHARGE: 0
SORE THROAT: 0
DIARRHEA: 0
COUGH: 0
ABDOMINAL PAIN: 0
SINUS PRESSURE: 0
SHORTNESS OF BREATH: 0
COLOR CHANGE: 0
STRIDOR: 0
CONSTIPATION: 0
BACK PAIN: 1
EYE ITCHING: 0
NAUSEA: 0
WHEEZING: 0
EYE PAIN: 0
RHINORRHEA: 0
VOMITING: 0

## 2022-02-04 NOTE — PROGRESS NOTES
she will not be following up. Today she reports that luckily her headaches and facial pressure have resolved. She does feel like this was related to a job she was doing at work and now that she is no longer doing that job her symptoms have improved. She does get an occasional headache. She does still have right sided neck pain that does occasionally radiate into the right upper arm causing soreness / mild numbness. She has no numbness or tingling in the hands. She is reaching a lot at work and thinks this aggravates those symptoms. She will occasionally use ibuprofen but not regularly. She denies any further low back pain other than occasionally, but she does still feel numbness and tingling in the left 3rd, 4th and 5th toes. This is associated with cramping of the toes. She has tried different shoes and this has not made a difference. She does request her FMLA be updated in case any of the above symptoms flare up again. She did have FMLA for the last 6 months and did use this about 3 times. She does complain of some right ear plugged sensation. She does wear ear plugs at work and wonders if she has wax buildup. She did have a cold around lola time and those symptoms have resolved. Her plugged ear was present before her cold. She has had some irregular menstrual cycles and fatigue but she attributes this to perimenopause. She has no other concerns at this time. cmw        Review of Systems   Constitutional: Positive for fatigue. Negative for appetite change and fever. HENT: Positive for ear pain (plugged sensation right eat). Negative for congestion, rhinorrhea, sinus pressure and sore throat. Eyes: Negative for pain, discharge, itching and visual disturbance. Respiratory: Negative for cough, shortness of breath, wheezing and stridor. Cardiovascular: Negative for chest pain, palpitations and leg swelling.    Gastrointestinal: Negative for abdominal pain, constipation, diarrhea, nausea and vomiting. Endocrine: Negative for polydipsia, polyphagia and polyuria. Genitourinary: Positive for menstrual problem. Negative for decreased urine volume, dysuria, frequency, hematuria and urgency. Musculoskeletal: Positive for back pain (occasional low back pain with heavy lifting) and neck pain (radiating to the right intermittently). Negative for neck stiffness. Skin: Negative for color change and rash. Allergic/Immunologic: Negative for immunocompromised state. Neurological: Positive for numbness and headaches (much better / still occasional headache). Negative for dizziness, tremors, seizures, syncope, speech difficulty, weakness and light-headedness. Hematological: Negative for adenopathy. Does not bruise/bleed easily. Psychiatric/Behavioral: Negative for dysphoric mood and sleep disturbance.           Objective      Patient-Reported Vitals    No data recorded         [INSTRUCTIONS:  \"[x]\" Indicates a positive item  \"[]\" Indicates a negative item  -- DELETE ALL ITEMS NOT EXAMINED]    Constitutional: [x] Appears well-developed and well-nourished [x] No apparent distress      [] Abnormal -     Mental status: [x] Alert and awake  [x] Oriented to person/place/time [x] Able to follow commands    [] Abnormal -     Eyes:   EOM    [x]  Normal    [] Abnormal -   Sclera  [x]  Normal    [] Abnormal -          Discharge [x]  None visible   [] Abnormal -     HENT: [x] Normocephalic, atraumatic  [] Abnormal -   [x] Mouth/Throat: Mucous membranes are moist    External Ears [x] Normal  [] Abnormal -    Neck: [x] No visualized mass [] Abnormal - tightness over the right trapezius where she points    Pulmonary/Chest: [x] Respiratory effort normal   [x] No visualized signs of difficulty breathing or respiratory distress        [] Abnormal -      Musculoskeletal:   [x] Normal gait with no signs of ataxia         [x] Normal range of motion of neck        [] Abnormal - tenderness over the deltoid area on the upper lateral arm    Neurological:        [x] No Facial Asymmetry (Cranial nerve 7 motor function) (limited exam due to video visit)          [x] No gaze palsy        [] Abnormal -          Skin:        [x] No significant exanthematous lesions or discoloration noted on facial skin         [] Abnormal -            Psychiatric:       [x] Normal Affect [] Abnormal -        [x] No Hallucinations    Other pertinent observable physical exam findings:-             On this date 2/4/2022 I have spent 30 minutes reviewing previous notes, test results and face to face (virtual) with the patient discussing the diagnosis and importance of compliance with the treatment plan as well as documenting on the day of the visit. Zeinab Urbano, was evaluated through a synchronous (real-time) audio-video encounter. The patient (or guardian if applicable) is aware that this is a billable service, which includes applicable co-pays. This Virtual Visit was conducted with patient's (and/or legal guardian's) consent. The visit was conducted pursuant to the emergency declaration under the [address] [place] [address], [address] [place] [address] waiver authority and the [employer] General Act. Patient identification was verified, and a caregiver was present when appropriate. The patient was located at home in a state where the provider was licensed to provide care.        --Dagoberto Mckeon MD

## 2022-03-12 DIAGNOSIS — R00.2 HEART PALPITATIONS: ICD-10-CM

## 2022-03-12 DIAGNOSIS — I47.1 SVT (SUPRAVENTRICULAR TACHYCARDIA) (HCC): ICD-10-CM

## 2022-03-14 NOTE — TELEPHONE ENCOUNTER
Last visit: 02/04/22  Last Med refill: 07/07/20  Does patient have enough medication for 72 hours: Yes    Next Visit Date:  Future Appointments   Date Time Provider Madeleine Trimble   4/18/2022  3:40 PM MD Codi Woodson 52 Maintenance   Topic Date Due    Depression Screen  Never done    Colorectal Cancer Screen  Never done    COVID-19 Vaccine (1) 10/07/2022 (Originally 3/21/1975)    Lipid screen  11/08/2022    DTaP/Tdap/Td vaccine (2 - Td or Tdap) 01/29/2025    Cervical cancer screen  02/18/2026    Flu vaccine  Completed    HIV screen  Completed    Hepatitis A vaccine  Aged Out    Hepatitis B vaccine  Aged Out    Hib vaccine  Aged Out    Meningococcal (ACWY) vaccine  Aged Out    Pneumococcal 0-64 years Vaccine  Aged Out    Hepatitis C screen  Discontinued       No results found for: LABA1C          ( goal A1C is < 7)   No results found for: LABMICR  LDL Cholesterol (mg/dL)   Date Value   11/08/2017 110   05/26/2015 111       (goal LDL is <100)   AST (U/L)   Date Value   02/18/2021 18     ALT (U/L)   Date Value   02/18/2021 14     BUN (mg/dL)   Date Value   02/18/2021 11     BP Readings from Last 3 Encounters:   10/18/21 117/78   08/25/21 120/78   08/13/21 124/78          (goal 120/80)    All Future Testing planned in [facility]  Lab Frequency Next Occurrence   Surgical Pathology Once 03/16/2021   Vitamin B12 & Folate Once 03/18/2022   TSH with Reflex Once 03/18/2022   LANA profile Once 03/18/2022               Patient Active Problem List:     Gastroesophageal reflux disease     Generalized anxiety disorder     Palpitations     SVT (supraventricular tachycardia) (HCC)     NSVT (nonsustained ventricular tachycardia) (HCC)     Headache disorder     Cervical stenosis of spine

## 2022-03-22 ENCOUNTER — OFFICE VISIT (OUTPATIENT)
Dept: FAMILY MEDICINE CLINIC | Age: 52
End: 2022-03-22
Payer: COMMERCIAL

## 2022-03-22 VITALS
RESPIRATION RATE: 14 BRPM | WEIGHT: 141 LBS | OXYGEN SATURATION: 98 % | HEIGHT: 66 IN | BODY MASS INDEX: 22.66 KG/M2 | SYSTOLIC BLOOD PRESSURE: 138 MMHG | HEART RATE: 83 BPM | TEMPERATURE: 97.6 F | DIASTOLIC BLOOD PRESSURE: 82 MMHG

## 2022-03-22 DIAGNOSIS — M75.51 BURSITIS OF RIGHT SHOULDER: Primary | ICD-10-CM

## 2022-03-22 DIAGNOSIS — M25.511 ACUTE PAIN OF RIGHT SHOULDER: ICD-10-CM

## 2022-03-22 DIAGNOSIS — R00.2 HEART PALPITATIONS: ICD-10-CM

## 2022-03-22 PROCEDURE — G8482 FLU IMMUNIZE ORDER/ADMIN: HCPCS | Performed by: NURSE PRACTITIONER

## 2022-03-22 PROCEDURE — G8427 DOCREV CUR MEDS BY ELIG CLIN: HCPCS | Performed by: NURSE PRACTITIONER

## 2022-03-22 PROCEDURE — 1036F TOBACCO NON-USER: CPT | Performed by: NURSE PRACTITIONER

## 2022-03-22 PROCEDURE — G8420 CALC BMI NORM PARAMETERS: HCPCS | Performed by: NURSE PRACTITIONER

## 2022-03-22 PROCEDURE — 3017F COLORECTAL CA SCREEN DOC REV: CPT | Performed by: NURSE PRACTITIONER

## 2022-03-22 PROCEDURE — 99204 OFFICE O/P NEW MOD 45 MIN: CPT | Performed by: NURSE PRACTITIONER

## 2022-03-22 RX ORDER — PREDNISONE 20 MG/1
20 TABLET ORAL DAILY
Qty: 5 TABLET | Refills: 0 | Status: SHIPPED | OUTPATIENT
Start: 2022-03-22 | End: 2022-03-27

## 2022-03-22 ASSESSMENT — PATIENT HEALTH QUESTIONNAIRE - PHQ9
SUM OF ALL RESPONSES TO PHQ QUESTIONS 1-9: 0
1. LITTLE INTEREST OR PLEASURE IN DOING THINGS: 0
2. FEELING DOWN, DEPRESSED OR HOPELESS: 0
SUM OF ALL RESPONSES TO PHQ QUESTIONS 1-9: 0
SUM OF ALL RESPONSES TO PHQ9 QUESTIONS 1 & 2: 0
SUM OF ALL RESPONSES TO PHQ QUESTIONS 1-9: 0
SUM OF ALL RESPONSES TO PHQ QUESTIONS 1-9: 0

## 2022-03-22 ASSESSMENT — ENCOUNTER SYMPTOMS
DIARRHEA: 0
SHORTNESS OF BREATH: 0
CONSTIPATION: 0
COUGH: 0
RHINORRHEA: 0
SORE THROAT: 0

## 2022-03-22 NOTE — PATIENT INSTRUCTIONS
PLEASE NOTE THAT ANY DISCONTINUATION OF MEDICATIONS OR MEDICAL SUPPLIES REFLECTED IN TODAY'S VISIT SUMMARY  MAY NOT HAVE COMPLETED AS A CHANGE IN YOUR PLAN OF CARE. THESE CHANGES MAY HAVE ONLY BEEN DONE SO IN ORDER TO CLEAN UP LIST FROM DUPLICATIONS OR MISCELLANEOUS SUPPLIES ONLY NEEDED PERIODIC REORDERS. DO NOT DISCONTINUE MEDICATIONS LISTED UNLESS SPECIFICALLY DISCUSSED IN YOUR APPOINTMENT WITH PROVIDER OR SPECIALIST, IF YOU HAVE AN QUESTIONS, PLEASE CONTACT YOUR PROVIDER FOR CLARIFICATION IF NOT ADDRESSED IN YOUR PLAN OF CARE. It was my pleasure to meet with you today. Please contact me with any questions or concerns, and please notify myself or our manager if there is anyway we can improve our service in your health care needs. Below I have listed some instructions and information that pertain to today's visit.    -You have been advised to continue all current medication, otherwise not discussed in today's visit  -New medications and refills will been sent and made available at pharmacy or mail away  -Heathy daily diet to include healthy balanced diet with good portions of lean meats and vegetables  -Drink 6-8 glasses of water daily  -Complete  fasting (8-12 hours - water, black coffee, plain tea ok) labs and/any other testing ordered prior to next scheduled followup  Patient Education        Bursitis: Care Instructions  Your Care Instructions     A bursa is a small sac of fluid that helps the tissues around a joint slide over one another easily. Injury or overuse of a joint can cause pain, redness, and inflammation in the bursa (bursitis). Bursitis usually gets better if you avoid the activity that caused it. You can help prevent bursitis from coming back by doing stretching and strengthening exercises. You may also need to change the way you do some activities. Follow-up care is a key part of your treatment and safety.  Be sure to make and go to all appointments, and call your doctor if you are having problems. It's also a good idea to know your test results and keep a list of the medicines you take. How can you care for yourself at home? · Put ice or a cold pack on the area for 10 to 20 minutes at a time. Try to do this every 1 to 2 hours for the next 3 days (when you are awake) or until the swelling goes down. Put a thin cloth between the ice and your skin. · After the 3 days of using ice, you may use heat on the area. You can use a hot water bottle; a warm, moist towel; or a heating pad set on low. You can also try alternating heat and ice. · Rest the area where you have pain. Stop any activities that cause pain. Switch to activities that do not stress the area. · Take pain medicines exactly as directed. ? If the doctor gave you a prescription medicine for pain, take it as prescribed. ? If you are not taking a prescription pain medicine, ask your doctor if you can take an over-the-counter medicine. ? Do not take two or more pain medicines at the same time unless the doctor told you to. Many pain medicines have acetaminophen, which is Tylenol. Too much acetaminophen (Tylenol) can be harmful. · To prevent stiffness, gently move the joint as much as you can without pain every day. As the pain gets better, keep doing range-of-motion exercises. Ask your doctor for exercises that will make the muscles around the joint stronger. Do these as directed. · You can slowly return to the activity that caused the pain, but do it with less effort until you can do it without pain or swelling. Be sure to warm up before and stretch after you do the activity. When should you call for help? Call your doctor now or seek immediate medical care if:    · You have new or worse symptoms of infection, such as:  ? Increased pain, swelling, warmth, or redness. ? Red streaks leading from the area. ? Pus draining from the area. ? A fever.    Watch closely for changes in your health, and be sure to contact your doctor if:    · You do not get better as expected. Where can you learn more? Go to https://chpepiceweb.[email or web address]. org and sign in to your [employer] account. Enter D563 in the [facility] box to learn more about \"Bursitis: Care Instructions. \"     If you do not have an account, please click on the \"Sign Up Now\" link. Current as of: July 1, 2021               Content Version: 13.1  © 2006-2021 Healthwise, [employer]. Care instructions adapted under license by [facility] ([facility]). If you have questions about a medical condition or this instruction, always ask your healthcare professional. [place] [address] any warranty or liability for your use of this information. Patient Education        Shoulder Bursitis: Exercises  Introduction  Here are some examples of exercises for you to try. The exercises may be suggested for a condition or for rehabilitation. Start each exercise slowly. Ease off the exercises if you start to have pain. You will be told when to start these exercises and which ones will work best for you. How to do the exercises  Posterior stretching exercise    1. Hold the elbow of your injured arm with your other hand. 2. Use your hand to pull your injured arm gently up and across your body. You will feel a gentle stretch across the back of your injured shoulder. 3. Hold for at least 15 to 30 seconds. Then slowly lower your arm. 4. Repeat 2 to 4 times. Up-the-back stretch    Your doctor or physical therapist may want you to wait to do this stretch until you have regained most of your range of motion and strength. You can do this stretch in different ways. Hold any of these stretches for at least 15 to 30 seconds. Repeat them 2 to 4 times. 1. Light stretch: Put your hand in your back pocket. Let it rest there to stretch your shoulder. 2. Moderate stretch:  With your other hand, hold your injured arm (palm outward) behind your back by the wrist. Pull your arm up gently to stretch your shoulder. 3. Advanced stretch: Put a towel over your other shoulder. Put the hand of your injured arm behind your back. Now hold the back end of the towel. With the other hand, hold the front end of the towel in front of your body. Pull gently on the front end of the towel. This will bring your hand farther up your back to stretch your shoulder. Overhead stretch    1. Standing about an arm's length away, grasp onto a solid surface. You could use a countertop, a doorknob, or the back of a sturdy chair. 2. With your knees slightly bent, bend forward with your arms straight. Lower your upper body, and let your shoulders stretch. 3. As your shoulders are able to stretch farther, you may need to take a step or two backward. 4. Hold for at least 15 to 30 seconds. Then stand up and relax. If you had stepped back during your stretch, step forward so you can keep your hands on the solid surface. 5. Repeat 2 to 4 times. Shoulder flexion (lying down)    To make a wand for this exercise, use a piece of PVC pipe or a broom handle with the broom removed. Make the wand about a foot wider than your shoulders. 1. Lie on your back, holding a wand with both hands. Your palms should face down as you hold the wand. 2. Keeping your elbows straight, slowly raise your arms over your head. Raise them until you feel a stretch in your shoulders, upper back, and chest.  3. Hold for 15 to 30 seconds. 4. Repeat 2 to 4 times. Shoulder rotation (lying down)    To make a wand for this exercise, use a piece of PVC pipe or a broom handle with the broom removed. Make the wand about a foot wider than your shoulders. 1. Lie on your back. Hold a wand with both hands with your elbows bent and palms up. 2. Keep your elbows close to your body, and move the wand across your body toward the sore arm. 3. Hold for 8 to 12 seconds. 4. Repeat 2 to 4 times. Shoulder blade squeeze    1.  Stand with your arms at your sides, and squeeze your shoulder blades together. Do not raise your shoulders up as you squeeze. 2. Hold 6 seconds. 3. Repeat 8 to 12 times. Shoulder flexor and extensor exercise    These are isometric exercises. That means you contract your muscles without actually moving. 1. Push forward (flex): Stand facing a wall or doorjamb, about 6 inches or less back. Hold your injured arm against your body. Make a closed fist with your thumb on top. Then gently push your hand forward into the wall with about 25% to 50% of your strength. Don't let your body move backward as you push. Hold for about 6 seconds. Relax for a few seconds. Repeat 8 to 12 times. 2. Push backward (extend): Stand with your back flat against a wall. Your upper arm should be against the wall, with your elbow bent 90 degrees (your hand straight ahead). Push your elbow gently back against the wall with about 25% to 50% of your strength. Don't let your body move forward as you push. Hold for about 6 seconds. Relax for a few seconds. Repeat 8 to 12 times. Scapular exercise: Wall push-ups    This exercise is best done with your fingers somewhat turned out, rather than straight up and down. 1. Stand facing a wall, about 12 inches to 18 inches away. 2. Place your hands on the wall at shoulder height. 3. Slowly bend your elbows and bring your face to the wall. Keep your back and hips straight. 4. Push back to where you started. 5. Repeat 8 to 12 times. 6. When you can do this exercise against a wall comfortably, you can try it against a counter. You can then slowly progress to the end of a couch, then to a sturdy chair, and finally to the floor. Scapular exercise: Retraction    For this exercise, you will need elastic exercise material, such as surgical tubing or Thera-Band. 1. Put the band around a solid object at about waist level. (A bedpost will work well.) Each hand should hold an end of the band.   2. With your elbows at your sides and bent to 90 degrees, pull the band back. Your shoulder blades should move toward each other. Then move your arms back where you started. 3. Repeat 8 to 12 times. 4. If you have good range of motion in your shoulders, try this exercise with your arms lifted out to the sides. Keep your elbows at a 90-degree angle. Raise the elastic band up to about shoulder level. Pull the band back to move your shoulder blades toward each other. Then move your arms back where you started. Internal rotator strengthening exercise    1. Start by tying a piece of elastic exercise material to a doorknob. You can use surgical tubing or Thera-Band. 2. Stand or sit with your shoulder relaxed and your elbow bent 90 degrees. Your upper arm should rest comfortably against your side. Squeeze a rolled towel between your elbow and your body for comfort. This will help keep your arm at your side. 3. Hold one end of the elastic band in the hand of the painful arm. 4. Slowly rotate your forearm toward your body until it touches your belly. Slowly move it back to where you started. 5. Keep your elbow and upper arm firmly tucked against the towel roll or at your side. 6. Repeat 8 to 12 times. External rotator strengthening exercise    1. Start by tying a piece of elastic exercise material to a doorknob. You can use surgical tubing or Thera-Band. (You may also hold one end of the band in each hand.)  2. Stand or sit with your shoulder relaxed and your elbow bent 90 degrees. Your upper arm should rest comfortably against your side. Squeeze a rolled towel between your elbow and your body for comfort. This will help keep your arm at your side. 3. Hold one end of the elastic band with the hand of the painful arm. 4. Start with your forearm across your belly. Slowly rotate the forearm out away from your body. Keep your elbow and upper arm tucked against the towel roll or the side of your body until you begin to feel tightness in your shoulder.  Slowly move your arm back to where you started. 5. Repeat 8 to 12 times. Follow-up care is a key part of your treatment and safety. Be sure to make and go to all appointments, and call your doctor if you are having problems. It's also a good idea to know your test results and keep a list of the medicines you take. Where can you learn more? Go to https://Entrepreneurs in Emerging MarketspepicThe Gluten Free Gourmet.[facility]. org and sign in to your [employer] account. Enter R456 in the [employer] box to learn more about \"Shoulder Bursitis: Exercises. \"     If you do not have an account, please click on the \"Sign Up Now\" link. Current as of: July 1, 2021               Content Version: 13.1  © 2006-2021 Healthwise, [employer]. Care instructions adapted under license by [facility] ([facility]). If you have questions about a medical condition or this instruction, always ask your healthcare professional. [address] any warranty or liability for your use of this information.

## 2022-03-22 NOTE — PROGRESS NOTES
[address] [facility]   [address] [address]  [phone]    3/22/2022     CHIEF COMPLAINT:     Rob Jo (:  1970) is a 50 y.o. female, here for evaluation of the following chief complaint(s):  Arm Pain (rt side X 1 month but worsened over the weekend) and Shoulder Pain (rt side X 1 month but worsened over the weekend )      REVIEWED INFORMATION      Allergies   Allergen Reactions    Erythromycin Rash    Penicillins Rash       Current Outpatient Medications   Medication Sig Dispense Refill    diclofenac sodium (VOLTAREN) 1 % GEL Apply 2 g topically 4 times daily for 15 days 150 g 0    acetaZOLAMIDE (DIAMOX) 125 MG tablet Take 1 tablet by mouth 2 times daily for 7 days Start on the day before ascent and continue until after descent 14 tablet 0    citalopram (CELEXA) 10 MG tablet Take 1 tablet by mouth daily 90 tablet 1    metoprolol succinate (TOPROL XL) 25 MG extended release tablet Take 0.5 tablets by mouth daily 15 tablet 3    ondansetron (ZOFRAN) 4 MG tablet Take 1 tablet by mouth every 8 hours as needed for Nausea or Vomiting (Patient not taking: Reported on 3/22/2022) 30 tablet 0     No current facility-administered medications for this visit. Patient Care Team:  Ankit Hemphill MD as PCP - Marycruz Ruiz MD as PCP - [facility] Provider    REVIEW OF SYSTEMS:     Review of Systems   Constitutional: Negative for activity change, fatigue and unexpected weight change. HENT: Negative for congestion, ear pain, hearing loss, rhinorrhea and sore throat. Respiratory: Negative for cough and shortness of breath. Cardiovascular: Negative for chest pain, palpitations and leg swelling. Gastrointestinal: Negative for constipation and diarrhea. Musculoskeletal: Positive for arthralgias (right should pain x 1 ). Negative for gait problem. Right shoulder with radiation down the right arm.     Neurological: Negative for dizziness, weakness and headaches. Psychiatric/Behavioral: Negative for confusion. The patient is not nervous/anxious. HISTORY OF PRESENT ILLNESS     Arm Pain   The incident occurred more than 1 week ago (last month). 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 The pain is present in the right shoulder, upper right arm and right elbow. The quality of the pain is described as aching and burning. The pain radiates to the right neck (can feel pain in the elbow). The pain is at a severity of 7/10 (when at it worse - at night ). The pain is moderate. The pain has been worsening since the incident. Pertinent negatives include no chest pain. The symptoms are aggravated by movement and lifting (laying night hurts). She has tried NSAIDs (mild improvement with 400 twice daily OTC, but would bother stomach biofreeze did not help ) for the symptoms. The treatment provided no relief. PHYSICAL EXAM:     /82 (Site: Left Upper Arm, Position: Sitting, Cuff Size: Medium Adult)   Pulse 83   Temp 97.6 °F (36.4 °C) (Temporal)   Resp 14   Ht 5' 5.5\" (1.664 m)   Wt 141 lb (64 kg)   LMP 11/22/2021 (Approximate)   SpO2 98%   BMI 23.11 kg/m²      Physical Exam  Constitutional:       Appearance: Normal appearance. She is well-developed. She is not ill-appearing. Eyes:      General:         Right eye: No discharge. Left eye: No discharge. Extraocular Movements: Extraocular movements intact. Conjunctiva/sclera: Conjunctivae normal.   Neck:      Thyroid: No thyroid mass. Vascular: No JVD. Pulmonary:      Effort: Pulmonary effort is normal. No respiratory distress. Musculoskeletal:      Right shoulder: Tenderness present. No deformity. Decreased range of motion. Decreased strength. Left shoulder: No deformity. Normal range of motion. Right upper arm: Tenderness present. Right elbow: Tenderness present. Cervical back: Normal range of motion. Skin:     General: Skin is moist.      Coloration: Skin is not cyanotic or jaundiced. Findings: No rash. Neurological:      General: No focal deficit present. Mental Status: She is alert and oriented to person, place, and time. Gait: Gait is intact. Psychiatric:         Attention and Perception: Attention normal. She does not perceive auditory or visual hallucinations. Mood and Affect: Mood normal.         Speech: Speech normal.          PROCEDURE/ IN OFFICE TESTING/ LAB REVIEW     No in office testing or procedures completed during today's office visit. ASSESSMENT/PLAN/ FOLLOWUP:     PLEASE NOTE THAT ANY DISCONTINUATION OF MEDICATIONS OR MEDICAL SUPPLIES REFLECTED IN TODAY'S VISIT SUMMARY  MAY NOT HAVE COMPLETED AS A CHANGE IN YOUR PLAN OF CARE. THESE CHANGES MAY HAVE ONLY BEEN DONE SO IN ORDER TO CLEAN UP LIST FROM DUPLICATIONS OR MISCELLANEOUS SUPPLIES ONLY NEEDED PERIODIC REORDERS. DO NOT DISCONTINUE MEDICATIONS LISTED UNLESS SPECIFICALLY DISCUSSED IN YOUR APPOINTMENT WITH PROVIDER OR SPECIALIST, IF YOU HAVE AN QUESTIONS, PLEASE CONTACT YOUR PROVIDER FOR CLARIFICATION IF NOT ADDRESSED IN YOUR PLAN OF CARE. 1. Bursitis of right shoulder  -     XR SHOULDER RIGHT (MIN 2 VIEWS); Future  -     diclofenac sodium (VOLTAREN) 1 % GEL; Apply 2 g topically 4 times daily for 15 days, Topical, 4 TIMES DAILY Starting Tue 3/22/2022, Until Wed 4/6/2022, For 15 days, Disp-150 g, R-0, Normal  -     predniSONE (DELTASONE) 20 MG tablet; Take 1 tablet by mouth daily for 5 days, Disp-5 tablet, R-0Normal  2. Acute pain of right shoulder  -     XR SHOULDER RIGHT (MIN 2 VIEWS); Future  -     diclofenac sodium (VOLTAREN) 1 % GEL; Apply 2 g topically 4 times daily for 15 days, Topical, 4 TIMES DAILY Starting Tue 3/22/2022, Until Wed 4/6/2022, For 15 days, Disp-150 g, R-0, Normal  -     predniSONE (DELTASONE) 20 MG tablet; Take 1 tablet by mouth daily for 5 days, Disp-5 tablet, R-0Normal  3.  Heart palpitations  -     Comprehensive Metabolic Panel, Fasting; Future      Return in about 1 week (around 3/29/2022) for recheck symptoms of today's primary complaint. COMMUNICATION:             The best way to find yourself is to lose yourself in the service of others - [address] [place] [address]. [address] [facility]   [email or web address]  Office: [phone]     An electronic signature was used to authenticate this note.   Signed by IZA Jorge CNP, APRN-CNP on 3/31/2022 at 10:24 PM

## 2022-03-25 DIAGNOSIS — M25.511 ACUTE PAIN OF RIGHT SHOULDER: ICD-10-CM

## 2022-03-25 DIAGNOSIS — M75.51 BURSITIS OF RIGHT SHOULDER: ICD-10-CM

## 2023-01-05 ENCOUNTER — OFFICE VISIT (OUTPATIENT)
Dept: FAMILY MEDICINE CLINIC | Age: 53
End: 2023-01-05
Payer: COMMERCIAL

## 2023-01-05 VITALS
BODY MASS INDEX: 22.65 KG/M2 | WEIGHT: 138.2 LBS | SYSTOLIC BLOOD PRESSURE: 104 MMHG | DIASTOLIC BLOOD PRESSURE: 82 MMHG | TEMPERATURE: 97.7 F | RESPIRATION RATE: 14 BRPM | HEART RATE: 87 BPM

## 2023-01-05 DIAGNOSIS — N95.1 PERIMENOPAUSE: ICD-10-CM

## 2023-01-05 DIAGNOSIS — I47.1 SVT (SUPRAVENTRICULAR TACHYCARDIA) (HCC): ICD-10-CM

## 2023-01-05 DIAGNOSIS — F41.1 GENERALIZED ANXIETY DISORDER: ICD-10-CM

## 2023-01-05 DIAGNOSIS — Z12.31 ENCOUNTER FOR SCREENING MAMMOGRAM FOR BREAST CANCER: ICD-10-CM

## 2023-01-05 DIAGNOSIS — I47.29 NSVT (NONSUSTAINED VENTRICULAR TACHYCARDIA): ICD-10-CM

## 2023-01-05 DIAGNOSIS — M25.512 CHRONIC LEFT SHOULDER PAIN: ICD-10-CM

## 2023-01-05 DIAGNOSIS — M79.602 LEFT ARM PAIN: ICD-10-CM

## 2023-01-05 DIAGNOSIS — M48.02 CERVICAL SPINAL STENOSIS: ICD-10-CM

## 2023-01-05 DIAGNOSIS — R20.2 TINGLING OF LEFT UPPER EXTREMITY: ICD-10-CM

## 2023-01-05 DIAGNOSIS — M54.2 NECK PAIN ON RIGHT SIDE: Primary | ICD-10-CM

## 2023-01-05 DIAGNOSIS — M48.02 NEUROFORAMINAL STENOSIS OF CERVICAL SPINE: ICD-10-CM

## 2023-01-05 DIAGNOSIS — G47.9 SLEEP DISTURBANCE: ICD-10-CM

## 2023-01-05 DIAGNOSIS — G89.29 CHRONIC LEFT SHOULDER PAIN: ICD-10-CM

## 2023-01-05 DIAGNOSIS — Z23 NEED FOR INFLUENZA VACCINATION: ICD-10-CM

## 2023-01-05 PROCEDURE — 90674 CCIIV4 VAC NO PRSV 0.5 ML IM: CPT | Performed by: PEDIATRICS

## 2023-01-05 PROCEDURE — 90471 IMMUNIZATION ADMIN: CPT | Performed by: PEDIATRICS

## 2023-01-05 PROCEDURE — G8427 DOCREV CUR MEDS BY ELIG CLIN: HCPCS | Performed by: PEDIATRICS

## 2023-01-05 PROCEDURE — G8420 CALC BMI NORM PARAMETERS: HCPCS | Performed by: PEDIATRICS

## 2023-01-05 PROCEDURE — 1036F TOBACCO NON-USER: CPT | Performed by: PEDIATRICS

## 2023-01-05 PROCEDURE — G8482 FLU IMMUNIZE ORDER/ADMIN: HCPCS | Performed by: PEDIATRICS

## 2023-01-05 PROCEDURE — 99214 OFFICE O/P EST MOD 30 MIN: CPT | Performed by: PEDIATRICS

## 2023-01-05 PROCEDURE — 3017F COLORECTAL CA SCREEN DOC REV: CPT | Performed by: PEDIATRICS

## 2023-01-05 RX ORDER — CITALOPRAM 10 MG/1
10 TABLET ORAL DAILY
Qty: 90 TABLET | Refills: 1 | Status: SHIPPED | OUTPATIENT
Start: 2023-01-05 | End: 2024-01-05

## 2023-01-05 ASSESSMENT — PATIENT HEALTH QUESTIONNAIRE - PHQ9
SUM OF ALL RESPONSES TO PHQ QUESTIONS 1-9: 0
SUM OF ALL RESPONSES TO PHQ9 QUESTIONS 1 & 2: 0
SUM OF ALL RESPONSES TO PHQ QUESTIONS 1-9: 0
2. FEELING DOWN, DEPRESSED OR HOPELESS: 0
1. LITTLE INTEREST OR PLEASURE IN DOING THINGS: 0
SUM OF ALL RESPONSES TO PHQ QUESTIONS 1-9: 0
SUM OF ALL RESPONSES TO PHQ QUESTIONS 1-9: 0

## 2023-01-05 ASSESSMENT — ENCOUNTER SYMPTOMS
STRIDOR: 0
COLOR CHANGE: 0
ABDOMINAL PAIN: 0
COUGH: 0
NAUSEA: 0
SHORTNESS OF BREATH: 0
DIARRHEA: 0
RHINORRHEA: 0
WHEEZING: 0
SINUS PRESSURE: 0
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
## 2023-01-09 DIAGNOSIS — M79.602 LEFT ARM PAIN: ICD-10-CM

## 2023-01-09 DIAGNOSIS — G89.29 CHRONIC LEFT SHOULDER PAIN: ICD-10-CM

## 2023-01-09 DIAGNOSIS — M25.512 CHRONIC LEFT SHOULDER PAIN: ICD-10-CM

## 2023-05-04 RX ORDER — ACETAZOLAMIDE 125 MG/1
125 TABLET ORAL 2 TIMES DAILY
Qty: 14 TABLET | Refills: 0 | Status: SHIPPED | OUTPATIENT
Start: 2023-05-04 | End: 2023-05-11

## 2023-06-03 LAB
ALBUMIN SERPL-MCNC: 4.1 G/DL
ALP BLD-CCNC: 55 U/L
ALT SERPL-CCNC: 11 U/L
ANION GAP SERPL CALCULATED.3IONS-SCNC: 1.6 MMOL/L
AST SERPL-CCNC: 16 U/L
AVERAGE GLUCOSE: NORMAL
BASOPHILS ABSOLUTE: 0 /ΜL
BASOPHILS RELATIVE PERCENT: 1 %
BILIRUB SERPL-MCNC: 0.5 MG/DL (ref 0.1–1.4)
BUN BLDV-MCNC: 12 MG/DL
CALCIUM SERPL-MCNC: 8.5 MG/DL
CHLORIDE BLD-SCNC: 105 MMOL/L
CHOLESTEROL, TOTAL: 208 MG/DL
CHOLESTEROL/HDL RATIO: ABNORMAL
CO2: 23 MMOL/L
CREAT SERPL-MCNC: 0.76 MG/DL
EGFR: 95
EOSINOPHILS ABSOLUTE: 0.2 /ΜL
EOSINOPHILS RELATIVE PERCENT: 5 %
FERRITIN: 41 NG/ML (ref 9–150)
GLUCOSE BLD-MCNC: 78 MG/DL
HBA1C MFR BLD: 5.2 %
HCT VFR BLD CALC: 38.1 % (ref 36–46)
HDLC SERPL-MCNC: 80 MG/DL (ref 35–70)
HEMOGLOBIN: 12.5 G/DL (ref 12–16)
IRON: 131
LDL CHOLESTEROL CALCULATED: 121 MG/DL (ref 0–160)
LYMPHOCYTES ABSOLUTE: 1.4 /ΜL
LYMPHOCYTES RELATIVE PERCENT: 37 %
MAGNESIUM: 2.2 MG/DL
MCH RBC QN AUTO: 31.9 PG
MCHC RBC AUTO-ENTMCNC: 32.8 G/DL
MCV RBC AUTO: 97 FL
MONOCYTES ABSOLUTE: 0.3 /ΜL
MONOCYTES RELATIVE PERCENT: 9 %
NEUTROPHILS ABSOLUTE: 1.9 /ΜL
NEUTROPHILS RELATIVE PERCENT: 48 %
NONHDLC SERPL-MCNC: ABNORMAL MG/DL
PLATELET # BLD: NORMAL 10*3/UL
PMV BLD AUTO: NORMAL FL
POTASSIUM SERPL-SCNC: 4.4 MMOL/L
RBC # BLD: 3.92 10^6/ΜL
SODIUM BLD-SCNC: 140 MMOL/L
TOTAL IRON BINDING CAPACITY: 242
TOTAL PROTEIN: 6.6
TRIGL SERPL-MCNC: 39 MG/DL
TSH SERPL DL<=0.05 MIU/L-ACNC: 1.28 UIU/ML
VITAMIN B-12: 229
VITAMIN D 25-HYDROXY: 34.1
VITAMIN D2, 25 HYDROXY: NORMAL
VITAMIN D3,25 HYDROXY: NORMAL
VLDLC SERPL CALC-MCNC: 7 MG/DL
WBC # BLD: 3.9 10^3/ML

## 2024-09-27 DIAGNOSIS — F41.1 GENERALIZED ANXIETY DISORDER: ICD-10-CM

## 2024-09-28 RX ORDER — CITALOPRAM HYDROBROMIDE 10 MG/1
10 TABLET ORAL DAILY
Qty: 90 TABLET | Refills: 1 | Status: SHIPPED | OUTPATIENT
Start: 2024-09-28 | End: 2025-09-28

## 2024-10-09 NOTE — ADDENDUM NOTE
Addended by: aLquita Connors on: 2/18/2021 05:44 PM     Modules accepted: Orders
complains of pain/discomfort

## 2024-10-17 ENCOUNTER — OFFICE VISIT (OUTPATIENT)
Dept: FAMILY MEDICINE CLINIC | Age: 54
End: 2024-10-17

## 2024-10-17 VITALS
TEMPERATURE: 97.7 F | BODY MASS INDEX: 23.46 KG/M2 | SYSTOLIC BLOOD PRESSURE: 110 MMHG | HEART RATE: 55 BPM | HEIGHT: 66 IN | OXYGEN SATURATION: 99 % | WEIGHT: 146 LBS | DIASTOLIC BLOOD PRESSURE: 78 MMHG

## 2024-10-17 DIAGNOSIS — Z23 NEED FOR INFLUENZA VACCINATION: ICD-10-CM

## 2024-10-17 DIAGNOSIS — I47.29 NSVT (NONSUSTAINED VENTRICULAR TACHYCARDIA) (HCC): ICD-10-CM

## 2024-10-17 DIAGNOSIS — Z00.00 ANNUAL PHYSICAL EXAM: Primary | ICD-10-CM

## 2024-10-17 DIAGNOSIS — Z86.19 HISTORY OF HPV INFECTION: ICD-10-CM

## 2024-10-17 DIAGNOSIS — G47.9 SLEEP DISTURBANCE: ICD-10-CM

## 2024-10-17 DIAGNOSIS — E53.8 LOW SERUM VITAMIN B12: ICD-10-CM

## 2024-10-17 DIAGNOSIS — E78.5 HYPERLIPIDEMIA, UNSPECIFIED HYPERLIPIDEMIA TYPE: ICD-10-CM

## 2024-10-17 DIAGNOSIS — E61.1 LOW IRON: ICD-10-CM

## 2024-10-17 DIAGNOSIS — N95.1 PERIMENOPAUSE: ICD-10-CM

## 2024-10-17 DIAGNOSIS — F41.1 GENERALIZED ANXIETY DISORDER: ICD-10-CM

## 2024-10-17 DIAGNOSIS — Z23 NEED FOR SHINGLES VACCINE: ICD-10-CM

## 2024-10-17 DIAGNOSIS — Z87.42 HISTORY OF ABNORMAL CERVICAL PAP SMEAR: ICD-10-CM

## 2024-10-17 DIAGNOSIS — I47.10 SVT (SUPRAVENTRICULAR TACHYCARDIA) (HCC): ICD-10-CM

## 2024-10-17 DIAGNOSIS — Z12.31 SCREENING MAMMOGRAM FOR BREAST CANCER: ICD-10-CM

## 2024-10-17 RX ORDER — ZOSTER VACCINE RECOMBINANT, ADJUVANTED 50 MCG/0.5
0.5 KIT INTRAMUSCULAR SEE ADMIN INSTRUCTIONS
Qty: 0.5 ML | Refills: 0 | Status: CANCELLED | OUTPATIENT
Start: 2024-10-17 | End: 2025-04-15

## 2024-10-17 SDOH — ECONOMIC STABILITY: FOOD INSECURITY: WITHIN THE PAST 12 MONTHS, YOU WORRIED THAT YOUR FOOD WOULD RUN OUT BEFORE YOU GOT MONEY TO BUY MORE.: NEVER TRUE

## 2024-10-17 SDOH — ECONOMIC STABILITY: FOOD INSECURITY: WITHIN THE PAST 12 MONTHS, THE FOOD YOU BOUGHT JUST DIDN'T LAST AND YOU DIDN'T HAVE MONEY TO GET MORE.: NEVER TRUE

## 2024-10-17 SDOH — ECONOMIC STABILITY: INCOME INSECURITY: HOW HARD IS IT FOR YOU TO PAY FOR THE VERY BASICS LIKE FOOD, HOUSING, MEDICAL CARE, AND HEATING?: NOT HARD AT ALL

## 2024-10-17 ASSESSMENT — PATIENT HEALTH QUESTIONNAIRE - PHQ9
SUM OF ALL RESPONSES TO PHQ QUESTIONS 1-9: 0
SUM OF ALL RESPONSES TO PHQ9 QUESTIONS 1 & 2: 0
2. FEELING DOWN, DEPRESSED OR HOPELESS: NOT AT ALL
1. LITTLE INTEREST OR PLEASURE IN DOING THINGS: NOT AT ALL

## 2024-10-17 NOTE — PROGRESS NOTES
A1C % 5.2         A1C POC % 5.2         GLU random mg/dL 78     76     CHOL mg/dL 208         TRIG mg/dL 39         HDL 35 - 70 mg/dL 80         LDL CALC 0 - 160 mg/dL 121         NA mmol/L 140     140     K mmol/L 4.4     4.1     BUN mg/dL 12     11     CR  0.76     0.65     GFR  95         CA mg/dL 8.5     8.7     ALT U/L 11     14     AST U/L 16     18     TSH uIU/mL 1.280     1.26  1.43    HGB 12.0 - 16.0 g/dL 12.5             This result is from an external source.       Lab Results   Component Value Date/Time    CHOL 208 06/03/2023 12:00 AM    CHOL 178 11/08/2017 08:49 AM    CHOL 190 05/26/2015 09:50 AM    TRIG 39 06/03/2023 12:00 AM    TRIG 44 11/08/2017 08:49 AM    TRIG 52 05/26/2015 09:50 AM    HDL 80 06/03/2023 12:00 AM    HDL 59 11/08/2017 08:49 AM    HDL 69 05/26/2015 09:50 AM    GLUCOSE 78 06/03/2023 12:00 AM    GLUCOSE 120 06/09/2020 12:50 PM    LABA1C 5.2 06/03/2023 12:00 AM       The 10-year ASCVD risk score (Oanh RODRIGUEZ, et al., 2019) is: 0.8%    Values used to calculate the score:      Age: 54 years      Sex: Female      Is Non- : No      Diabetic: No      Tobacco smoker: No      Systolic Blood Pressure: 110 mmHg      Is BP treated: No      HDL Cholesterol: 80 mg/dL      Total Cholesterol: 208 mg/dL    Immunization History   Administered Date(s) Administered    Hep A, HAVRIX, VAQTA, (age 19y+), IM, 1mL 07/10/2018    Hepatitis A 07/10/2018    Influenza 11/10/2011, 11/01/2012, 10/17/2013    Influenza Virus Vaccine 10/15/2014, 10/29/2015    Influenza, AFLURIA (age 3 y+), FLUZONE, (age 6 mo+), Quadv MDV, 0.5mL 10/12/2016, 11/08/2017, 10/24/2018    Influenza, FLUARIX, FLULAVAL, FLUZONE (age 6 mo+) and AFLURIA, (age 3 y+), Quadv PF, 0.5mL 09/28/2019    Influenza, FLUCELVAX, (age 6 mo+) IM, Trivalent PF, 0.5mL 10/17/2024    Influenza, FLUCELVAX, (age 6 mo+), MDCK, Quadv PF, 0.5mL 10/07/2021, 01/05/2023    TDaP, ADACEL (age 10y-64y), BOOSTRIX (age 10y+), IM, 0.5mL 01/29/2015

## 2024-11-15 DIAGNOSIS — E53.8 LOW SERUM VITAMIN B12: ICD-10-CM

## 2024-11-15 DIAGNOSIS — E61.1 LOW IRON: ICD-10-CM

## 2024-11-15 DIAGNOSIS — E78.5 HYPERLIPIDEMIA, UNSPECIFIED HYPERLIPIDEMIA TYPE: ICD-10-CM

## 2024-11-15 LAB
ALBUMIN: 4.1 G/DL
ALP BLD-CCNC: 70 U/L
ALT SERPL-CCNC: 11 U/L
ANION GAP SERPL CALCULATED.3IONS-SCNC: 9 MMOL/L
AST SERPL-CCNC: 15 U/L
BASOPHILS ABSOLUTE: NORMAL
BASOPHILS RELATIVE PERCENT: NORMAL
BILIRUB SERPL-MCNC: 0.7 MG/DL (ref 0.1–1.4)
BUN BLDV-MCNC: 12 MG/DL
CALCIUM SERPL-MCNC: 9.5 MG/DL
CHLORIDE BLD-SCNC: 106 MMOL/L
CHOLESTEROL, TOTAL: 245 MG/DL
CHOLESTEROL/HDL RATIO: 3.55
CO2: 25 MMOL/L
CREAT SERPL-MCNC: 0.84 MG/DL
EOSINOPHILS ABSOLUTE: NORMAL
EOSINOPHILS RELATIVE PERCENT: NORMAL
FERRITIN: 44.7 NG/ML (ref 9–150)
GFR, ESTIMATED: >60
GLUCOSE BLD-MCNC: 79 MG/DL
HCT VFR BLD CALC: 41 % (ref 36–46)
HDLC SERPL-MCNC: 69 MG/DL (ref 35–70)
HEMOGLOBIN: 13.5 G/DL (ref 12–16)
IRON: 132
LDL CHOLESTEROL: 163
LYMPHOCYTES ABSOLUTE: NORMAL
LYMPHOCYTES RELATIVE PERCENT: NORMAL
MCH RBC QN AUTO: 31 PG
MCHC RBC AUTO-ENTMCNC: 32.9 G/DL
MCV RBC AUTO: 94 FL
MONOCYTES ABSOLUTE: NORMAL
MONOCYTES RELATIVE PERCENT: NORMAL
NEUTROPHILS ABSOLUTE: NORMAL
NEUTROPHILS RELATIVE PERCENT: NORMAL
NONHDLC SERPL-MCNC: NORMAL MG/DL
PLATELET # BLD: 223 K/ΜL
PMV BLD AUTO: 10.5 FL
POTASSIUM SERPL-SCNC: 4.2 MMOL/L
RBC # BLD: 4.36 10^6/ΜL
SODIUM BLD-SCNC: 140 MMOL/L
TOTAL IRON BINDING CAPACITY: 240
TOTAL PROTEIN: 7.1 G/DL (ref 6.4–8.2)
TRIGL SERPL-MCNC: 64 MG/DL
VITAMIN B-12: 190
VLDLC SERPL CALC-MCNC: 13 MG/DL
WBC # BLD: 4 10^3/ML

## 2024-11-21 DIAGNOSIS — E53.8 LOW SERUM VITAMIN B12: ICD-10-CM

## 2024-11-21 DIAGNOSIS — E78.5 HYPERLIPIDEMIA, UNSPECIFIED HYPERLIPIDEMIA TYPE: Primary | ICD-10-CM

## 2024-11-25 DIAGNOSIS — Z12.31 SCREENING MAMMOGRAM FOR BREAST CANCER: ICD-10-CM

## 2025-07-21 ENCOUNTER — PATIENT MESSAGE (OUTPATIENT)
Dept: FAMILY MEDICINE CLINIC | Age: 55
End: 2025-07-21

## 2025-07-21 DIAGNOSIS — R07.81 RIB PAIN ON LEFT SIDE: Primary | ICD-10-CM

## 2025-07-21 NOTE — TELEPHONE ENCOUNTER
Could be a broken rib or pulled muscle in between the ribs.  Either way it would be best for her to be seen.  I bet one of the NPs/PA has an open appointment to get her in for evaluation.

## 2025-07-22 ENCOUNTER — OFFICE VISIT (OUTPATIENT)
Dept: FAMILY MEDICINE CLINIC | Age: 55
End: 2025-07-22
Payer: COMMERCIAL

## 2025-07-22 VITALS
OXYGEN SATURATION: 98 % | BODY MASS INDEX: 23.43 KG/M2 | HEART RATE: 74 BPM | TEMPERATURE: 97.8 F | SYSTOLIC BLOOD PRESSURE: 112 MMHG | WEIGHT: 143 LBS | DIASTOLIC BLOOD PRESSURE: 82 MMHG

## 2025-07-22 DIAGNOSIS — R07.81 RIB PAIN ON LEFT SIDE: Primary | ICD-10-CM

## 2025-07-22 PROCEDURE — 99213 OFFICE O/P EST LOW 20 MIN: CPT

## 2025-07-22 RX ORDER — LIDOCAINE 50 MG/G
1 PATCH TOPICAL DAILY
Qty: 10 PATCH | Refills: 0 | Status: SHIPPED | OUTPATIENT
Start: 2025-07-22 | End: 2025-08-01

## 2025-07-22 ASSESSMENT — ENCOUNTER SYMPTOMS
COUGH: 0
SHORTNESS OF BREATH: 0
ABDOMINAL PAIN: 0
NAUSEA: 0
VOMITING: 0

## 2025-07-22 NOTE — PROGRESS NOTES
Rebeca Desir (:  1970) is a 55 y.o. female,Established patient, here for evaluation of the following chief complaint(s):  Rib Pain (Left rib pain after completing a obstacle run on . )      Assessment & Plan  Rib pain on left side   Acute condition, new, Supportive Care  - Encouraged alternating Tylenol/Ibuprofen for pain, as well as topical Lidoderm patch over most tender area for 12 hours, followed by a 12 hour break. Patient can also try icy-hot and tiger balm as needed.   - Encouraged breathing exercises to maintain good cardiovascular health and decreased risk of atelectasis or pleural effusions. Monitor symptosm and consider CXR if not improving.   - Discussed possible ways to protect ribs going forward in her competitions.     Orders:    lidocaine (LIDODERM) 5 %; Place 1 patch onto the skin daily for 10 days 12 hours on, 12 hours off.      Return if symptoms worsen or fail to improve.    Subjective       Surgical History:   Past Surgical History:   Procedure Laterality Date     SECTION         Patient Active Problem List   Diagnosis    Gastroesophageal reflux disease    Generalized anxiety disorder    Palpitations    SVT (supraventricular tachycardia)    NSVT (nonsustained ventricular tachycardia) (HCC)    Headache disorder    Cervical stenosis of spine        HPI    Patient presents for acute care of left-sided rib pain.  She reports she completed an obstacle course race on  and did not have any pain while she was completing the race, but notes left-sided pain, she cannot the car.  Pain is only on her lower left ribs, with tenderness to touch and increased pain with laying down, sitting up, and rolling in bed.  She describes it as sharp when pressed for twisting, but has a dull ache otherwise.  She denies any pain or radiation elsewhere, and has not noticed any bruises or skin changes.  She rates her pain a 5 out of 10 when standing or sitting, or coughing, but a 1

## 2025-07-30 ENCOUNTER — HOSPITAL ENCOUNTER (OUTPATIENT)
Age: 55
Discharge: HOME | End: 2025-08-01
Payer: COMMERCIAL

## 2025-07-30 ENCOUNTER — PATIENT MESSAGE (OUTPATIENT)
Dept: FAMILY MEDICINE CLINIC | Age: 55
End: 2025-07-30

## 2025-07-30 ENCOUNTER — HOSPITAL ENCOUNTER (OUTPATIENT)
Dept: GENERAL RADIOLOGY | Age: 55
Discharge: HOME | End: 2025-08-01
Payer: COMMERCIAL

## 2025-07-30 DIAGNOSIS — R07.81 RIB PAIN ON LEFT SIDE: ICD-10-CM

## 2025-07-30 PROCEDURE — 71046 X-RAY EXAM CHEST 2 VIEWS: CPT

## 2025-08-08 ENCOUNTER — OFFICE VISIT (OUTPATIENT)
Dept: FAMILY MEDICINE CLINIC | Age: 55
End: 2025-08-08
Payer: COMMERCIAL

## 2025-08-08 VITALS
HEART RATE: 71 BPM | HEIGHT: 66 IN | SYSTOLIC BLOOD PRESSURE: 108 MMHG | WEIGHT: 143 LBS | OXYGEN SATURATION: 98 % | TEMPERATURE: 98.3 F | BODY MASS INDEX: 22.98 KG/M2 | DIASTOLIC BLOOD PRESSURE: 74 MMHG

## 2025-08-08 DIAGNOSIS — R07.81 RIB PAIN ON LEFT SIDE: Primary | ICD-10-CM

## 2025-08-08 PROCEDURE — 99212 OFFICE O/P EST SF 10 MIN: CPT | Performed by: PEDIATRICS

## 2025-08-08 SDOH — ECONOMIC STABILITY: FOOD INSECURITY: WITHIN THE PAST 12 MONTHS, YOU WORRIED THAT YOUR FOOD WOULD RUN OUT BEFORE YOU GOT MONEY TO BUY MORE.: NEVER TRUE

## 2025-08-08 SDOH — ECONOMIC STABILITY: FOOD INSECURITY: WITHIN THE PAST 12 MONTHS, THE FOOD YOU BOUGHT JUST DIDN'T LAST AND YOU DIDN'T HAVE MONEY TO GET MORE.: NEVER TRUE

## 2025-08-08 ASSESSMENT — ENCOUNTER SYMPTOMS
DIARRHEA: 0
SHORTNESS OF BREATH: 0
NAUSEA: 0
ABDOMINAL PAIN: 0
COUGH: 0
CHEST TIGHTNESS: 0
STRIDOR: 0
VOMITING: 0
WHEEZING: 0
CONSTIPATION: 0

## 2025-08-08 ASSESSMENT — PATIENT HEALTH QUESTIONNAIRE - PHQ9
SUM OF ALL RESPONSES TO PHQ QUESTIONS 1-9: 0
2. FEELING DOWN, DEPRESSED OR HOPELESS: NOT AT ALL
1. LITTLE INTEREST OR PLEASURE IN DOING THINGS: NOT AT ALL
SUM OF ALL RESPONSES TO PHQ QUESTIONS 1-9: 0

## 2025-08-11 DIAGNOSIS — R07.81 RIB PAIN ON LEFT SIDE: ICD-10-CM
